# Patient Record
Sex: MALE | Race: WHITE | NOT HISPANIC OR LATINO | Employment: FULL TIME | URBAN - METROPOLITAN AREA
[De-identification: names, ages, dates, MRNs, and addresses within clinical notes are randomized per-mention and may not be internally consistent; named-entity substitution may affect disease eponyms.]

---

## 2021-03-17 ENCOUNTER — ANCILLARY PROCEDURE (OUTPATIENT)
Dept: GENERAL RADIOLOGY | Facility: OTHER | Age: 51
End: 2021-03-17
Attending: PHYSICIAN ASSISTANT
Payer: COMMERCIAL

## 2021-03-17 ENCOUNTER — OFFICE VISIT (OUTPATIENT)
Dept: FAMILY MEDICINE | Facility: OTHER | Age: 51
End: 2021-03-17
Payer: COMMERCIAL

## 2021-03-17 VITALS
HEART RATE: 80 BPM | HEIGHT: 66 IN | DIASTOLIC BLOOD PRESSURE: 70 MMHG | SYSTOLIC BLOOD PRESSURE: 130 MMHG | WEIGHT: 135 LBS | BODY MASS INDEX: 21.69 KG/M2 | TEMPERATURE: 98.8 F

## 2021-03-17 DIAGNOSIS — R20.2 NUMBNESS AND TINGLING IN BOTH HANDS: ICD-10-CM

## 2021-03-17 DIAGNOSIS — R20.0 NUMBNESS AND TINGLING IN BOTH HANDS: ICD-10-CM

## 2021-03-17 DIAGNOSIS — R97.20 ELEVATED PROSTATE SPECIFIC ANTIGEN (PSA): ICD-10-CM

## 2021-03-17 DIAGNOSIS — E78.5 HYPERLIPIDEMIA LDL GOAL <100: ICD-10-CM

## 2021-03-17 DIAGNOSIS — F17.200 TOBACCO USE DISORDER: ICD-10-CM

## 2021-03-17 DIAGNOSIS — R06.02 SHORTNESS OF BREATH: Primary | ICD-10-CM

## 2021-03-17 DIAGNOSIS — Z13.6 CARDIOVASCULAR SCREENING; LDL GOAL LESS THAN 100: ICD-10-CM

## 2021-03-17 DIAGNOSIS — Z12.5 SCREENING FOR PROSTATE CANCER: ICD-10-CM

## 2021-03-17 DIAGNOSIS — R06.02 SHORTNESS OF BREATH: ICD-10-CM

## 2021-03-17 LAB
ALBUMIN SERPL-MCNC: 4.2 G/DL (ref 3.4–5)
ALP SERPL-CCNC: 96 U/L (ref 40–150)
ALT SERPL W P-5'-P-CCNC: 24 U/L (ref 0–70)
ANION GAP SERPL CALCULATED.3IONS-SCNC: 7 MMOL/L (ref 3–14)
AST SERPL W P-5'-P-CCNC: 17 U/L (ref 0–45)
BASOPHILS # BLD AUTO: 0 10E9/L (ref 0–0.2)
BASOPHILS NFR BLD AUTO: 0.4 %
BILIRUB SERPL-MCNC: 0.7 MG/DL (ref 0.2–1.3)
BUN SERPL-MCNC: 10 MG/DL (ref 7–30)
CALCIUM SERPL-MCNC: 9.1 MG/DL (ref 8.5–10.1)
CHLORIDE SERPL-SCNC: 102 MMOL/L (ref 94–109)
CHOLEST SERPL-MCNC: 247 MG/DL
CO2 SERPL-SCNC: 25 MMOL/L (ref 20–32)
CREAT SERPL-MCNC: 0.83 MG/DL (ref 0.66–1.25)
CRP SERPL-MCNC: <2.9 MG/L (ref 0–8)
DIFFERENTIAL METHOD BLD: NORMAL
EOSINOPHIL # BLD AUTO: 0.1 10E9/L (ref 0–0.7)
EOSINOPHIL NFR BLD AUTO: 0.6 %
ERYTHROCYTE [DISTWIDTH] IN BLOOD BY AUTOMATED COUNT: 13.3 % (ref 10–15)
ERYTHROCYTE [SEDIMENTATION RATE] IN BLOOD BY WESTERGREN METHOD: 4 MM/H (ref 0–20)
GFR SERPL CREATININE-BSD FRML MDRD: >90 ML/MIN/{1.73_M2}
GLUCOSE SERPL-MCNC: 81 MG/DL (ref 70–99)
HCT VFR BLD AUTO: 49.3 % (ref 40–53)
HDLC SERPL-MCNC: 64 MG/DL
HGB BLD-MCNC: 16.8 G/DL (ref 13.3–17.7)
LDLC SERPL CALC-MCNC: 170 MG/DL
LYMPHOCYTES # BLD AUTO: 1.7 10E9/L (ref 0.8–5.3)
LYMPHOCYTES NFR BLD AUTO: 19.6 %
MCH RBC QN AUTO: 31.9 PG (ref 26.5–33)
MCHC RBC AUTO-ENTMCNC: 34.1 G/DL (ref 31.5–36.5)
MCV RBC AUTO: 94 FL (ref 78–100)
MONOCYTES # BLD AUTO: 0.8 10E9/L (ref 0–1.3)
MONOCYTES NFR BLD AUTO: 9.3 %
NEUTROPHILS # BLD AUTO: 6 10E9/L (ref 1.6–8.3)
NEUTROPHILS NFR BLD AUTO: 70.1 %
NONHDLC SERPL-MCNC: 183 MG/DL
PLATELET # BLD AUTO: 215 10E9/L (ref 150–450)
POTASSIUM SERPL-SCNC: 4.5 MMOL/L (ref 3.4–5.3)
PROT SERPL-MCNC: 7.7 G/DL (ref 6.8–8.8)
PSA SERPL-ACNC: 4.18 UG/L (ref 0–4)
RBC # BLD AUTO: 5.27 10E12/L (ref 4.4–5.9)
SODIUM SERPL-SCNC: 134 MMOL/L (ref 133–144)
TRIGL SERPL-MCNC: 64 MG/DL
TSH SERPL DL<=0.005 MIU/L-ACNC: 0.93 MU/L (ref 0.4–4)
WBC # BLD AUTO: 8.5 10E9/L (ref 4–11)

## 2021-03-17 PROCEDURE — 80050 GENERAL HEALTH PANEL: CPT | Performed by: PHYSICIAN ASSISTANT

## 2021-03-17 PROCEDURE — 86038 ANTINUCLEAR ANTIBODIES: CPT | Performed by: PHYSICIAN ASSISTANT

## 2021-03-17 PROCEDURE — 80061 LIPID PANEL: CPT | Performed by: PHYSICIAN ASSISTANT

## 2021-03-17 PROCEDURE — 86431 RHEUMATOID FACTOR QUANT: CPT | Performed by: PHYSICIAN ASSISTANT

## 2021-03-17 PROCEDURE — 99214 OFFICE O/P EST MOD 30 MIN: CPT | Performed by: PHYSICIAN ASSISTANT

## 2021-03-17 PROCEDURE — 86140 C-REACTIVE PROTEIN: CPT | Performed by: PHYSICIAN ASSISTANT

## 2021-03-17 PROCEDURE — 36415 COLL VENOUS BLD VENIPUNCTURE: CPT | Performed by: PHYSICIAN ASSISTANT

## 2021-03-17 PROCEDURE — 85652 RBC SED RATE AUTOMATED: CPT | Performed by: PHYSICIAN ASSISTANT

## 2021-03-17 PROCEDURE — 71046 X-RAY EXAM CHEST 2 VIEWS: CPT | Performed by: RADIOLOGY

## 2021-03-17 PROCEDURE — G0103 PSA SCREENING: HCPCS | Performed by: PHYSICIAN ASSISTANT

## 2021-03-17 RX ORDER — ATORVASTATIN CALCIUM 20 MG/1
20 TABLET, FILM COATED ORAL DAILY
Qty: 90 TABLET | Refills: 0 | Status: SHIPPED | OUTPATIENT
Start: 2021-03-17 | End: 2024-09-26

## 2021-03-17 ASSESSMENT — ANXIETY QUESTIONNAIRES
3. WORRYING TOO MUCH ABOUT DIFFERENT THINGS: NOT AT ALL
GAD7 TOTAL SCORE: 0
5. BEING SO RESTLESS THAT IT IS HARD TO SIT STILL: NOT AT ALL
6. BECOMING EASILY ANNOYED OR IRRITABLE: NOT AT ALL
7. FEELING AFRAID AS IF SOMETHING AWFUL MIGHT HAPPEN: NOT AT ALL
1. FEELING NERVOUS, ANXIOUS, OR ON EDGE: NOT AT ALL
GAD7 TOTAL SCORE: 0
4. TROUBLE RELAXING: NOT AT ALL
7. FEELING AFRAID AS IF SOMETHING AWFUL MIGHT HAPPEN: NOT AT ALL
2. NOT BEING ABLE TO STOP OR CONTROL WORRYING: NOT AT ALL
GAD7 TOTAL SCORE: 0

## 2021-03-17 ASSESSMENT — PATIENT HEALTH QUESTIONNAIRE - PHQ9
10. IF YOU CHECKED OFF ANY PROBLEMS, HOW DIFFICULT HAVE THESE PROBLEMS MADE IT FOR YOU TO DO YOUR WORK, TAKE CARE OF THINGS AT HOME, OR GET ALONG WITH OTHER PEOPLE: NOT DIFFICULT AT ALL
SUM OF ALL RESPONSES TO PHQ QUESTIONS 1-9: 1
SUM OF ALL RESPONSES TO PHQ QUESTIONS 1-9: 1

## 2021-03-17 ASSESSMENT — MIFFLIN-ST. JEOR: SCORE: 1414.86

## 2021-03-17 NOTE — LETTER
March 18, 2021      Dewey uSltana  [address]  [facility] [address]        Dear ,    His thyroid is normal.  His kidney, liver function is normal.  Blood count normal.  Inflammatory marker negative.  His cholesterol is too high and highly recommend starting a statin to lower as well as quitting smoking and following Mediterranean Diet.  Sent med to pharmacy.   His prostate lab was just slightly elevated, at this time recommend repeating in 3-6 months.  He will also need to repeat cholesterol in 3-6 months.  Please schedule for stress echo.  His chest x-ray was normal.       Resulted Orders   Comprehensive metabolic panel   Result Value Ref Range    Sodium 134 133 - 144 mmol/L    Potassium 4.5 3.4 - 5.3 mmol/L    Chloride 102 94 - 109 mmol/L    Carbon Dioxide 25 20 - 32 mmol/L    Anion Gap 7 3 - 14 mmol/L    Glucose 81 70 - 99 mg/dL    Urea Nitrogen 10 7 - 30 mg/dL    Creatinine 0.83 0.66 - 1.25 mg/dL    GFR Estimate >90 >60 mL/min/[1.73_m2]      Comment:      Non  GFR Calc  Starting 12/18/2018, serum creatinine based estimated GFR (eGFR) will be   calculated using the Chronic Kidney Disease Epidemiology Collaboration   (CKD-EPI) equation.      GFR Estimate If Black >90 >60 mL/min/[1.73_m2]      Comment:       GFR Calc  Starting 12/18/2018, serum creatinine based estimated GFR (eGFR) will be   calculated using the Chronic Kidney Disease Epidemiology Collaboration   (CKD-EPI) equation.      Calcium 9.1 8.5 - 10.1 mg/dL    Bilirubin Total 0.7 0.2 - 1.3 mg/dL    Albumin 4.2 3.4 - 5.0 g/dL    Protein Total 7.7 6.8 - 8.8 g/dL    Alkaline Phosphatase 96 40 - 150 U/L    ALT 24 0 - 70 U/L    AST 17 0 - 45 U/L   TSH with free T4 reflex   Result Value Ref Range    TSH 0.93 0.40 - 4.00 mU/L   CBC with platelets differential   Result Value Ref Range    WBC 8.5 4.0 - 11.0 10e9/L    RBC Count 5.27 4.4 - 5.9 10e12/L    Hemoglobin 16.8 13.3 - 17.7 g/dL    Hematocrit 49.3  40.0 - 53.0 %    MCV 94 78 - 100 fl    MCH 31.9 26.5 - 33.0 pg    MCHC 34.1 31.5 - 36.5 g/dL    RDW 13.3 10.0 - 15.0 %    Platelet Count 215 150 - 450 10e9/L    % Neutrophils 70.1 %    % Lymphocytes 19.6 %    % Monocytes 9.3 %    % Eosinophils 0.6 %    % Basophils 0.4 %    Absolute Neutrophil 6.0 1.6 - 8.3 10e9/L    Absolute Lymphocytes 1.7 0.8 - 5.3 10e9/L    Absolute Monocytes 0.8 0.0 - 1.3 10e9/L    Absolute Eosinophils 0.1 0.0 - 0.7 10e9/L    Absolute Basophils 0.0 0.0 - 0.2 10e9/L    Diff Method Automated Method    PSA, screen   Result Value Ref Range    PSA 4.18 (H) 0 - 4 ug/L      Comment:      Assay Method:  Chemiluminescence using Siemens Vista analyzer   CRP, inflammation   Result Value Ref Range    CRP Inflammation <2.9 0.0 - 8.0 mg/L   ESR: Erythrocyte sedimentation rate   Result Value Ref Range    Sed Rate 4 0 - 20 mm/h   Lipid panel reflex to direct LDL Fasting   Result Value Ref Range    Cholesterol 247 (H) <200 mg/dL      Comment:      Desirable:       <200 mg/dl    Triglycerides 64 <150 mg/dL    HDL Cholesterol 64 >39 mg/dL    LDL Cholesterol Calculated 170 (H) <100 mg/dL      Comment:      Above desirable:  100-129 mg/dl  Borderline High:  130-159 mg/dL  High:             160-189 mg/dL  Very high:       >189 mg/dl      Non HDL Cholesterol 183 (H) <130 mg/dL      Comment:      Above Desirable:  130-159 mg/dl  Borderline high:  160-189 mg/dl  High:             190-219 mg/dl  Very high:       >219 mg/dl         If you have any questions or concerns, please call the clinic at the number listed above.       Sincerely,      Alba Arechiga PA-C

## 2021-03-17 NOTE — PROGRESS NOTES
Assessment & Plan     Shortness of breath  -Differentials include but are not limited to pulmonary etiology or mass, especially given patient has smoked for the past 20 years; cardiac etiology- arrhythmia, cardiomyopathy, valve disease or prolapse, especially given patient's family history of heart valve disease; Thyroid or autoimmune etiology  - XR Chest 2 Views today. Final read came back negative for cardiopulmonary disease.  - Recommend getting an Echocardiogram Exercise Stress as patient is experiencing SOB with exertion to rule out other cardiac abnormalities or concerns.  He has had previous EKG and Holter monitor work up without significant abnormalities.    -Will get labs today to further assess and rule out other causes for his SOB.   - Comprehensive metabolic panel   - TSH with free T4 reflex   - CBC with platelets differential   - CRP, inflammation   - ESR: Erythrocyte sedimentation rate    Tobacco use disorder  -Discussed cutting back on smoking and options to help with this. He is interested in decreasing the amount that he smokes per day. He will try to do so and will reach out if he needs something to help with this.  -Given patient's smoking history and SOB, may consider a chest CT to further evaluate pulmonary    Numbness and tingling in both hands/fingers  Discussed with patient that this may be caused by Raynaud's and will get labs today to assess for any underlying autoimmune conditions that may be causing the coolness and tingling that he is experiencing in his fingers. No significant concerns on examination today with overt vascular supply.  Encouraged to quit smoking to help prevent vascular disease.     - Anti Nuclear Ela IgG by IFA with Reflex   - Rheumatoid factor    Screening for prostate cancer  Will get a routine screen for prostrate cancer today- PSA, screen    CARDIOVASCULAR SCREENING; LDL GOAL LESS THAN 100  Will assess cholesterol today, especially given concerns with cardiac  and SOB  - Lipid panel reflex to direct LDL Fasting    Elevated PSA:  - PSA was elevated slightly recommend repeat in 6 months and if continuing to increase referral to Urology.     Hyperlipidemia:  The 10-year ASCVD risk score (Mosby CARMEN Jr., et al., 2013) is: 8.7%    Values used to calculate the score:      Age: 50 years      Sex: Male      Is Non- : No      Diabetic: No      Tobacco smoker: Yes      Systolic Blood Pressure: 130 mmHg      Is BP treated: No      HDL Cholesterol: 64 mg/dL      Total Cholesterol: 247 mg/dL  His cholesterol was elevated, recommend starting statin and re-checking labs in 3-6 months.     Tobacco Cessation:   reports that he has been smoking cigarettes. He has a 20.00 pack-year smoking history. He has never used smokeless tobacco.  Tobacco Cessation Action Plan: Information offered: Patient not interested at this time    No follow-ups on file.    Options for treatment and follow-up care were reviewed with the patient and/or guardian. Patient and/or guardian engaged in the decision making process and verbalized understanding of the options discussed and agreed with the final plan.    I, Alba Arechiga PA-C, was present with the NP student who participated in the service and in the documentation of the note.  I have verified the history and personally performed the physical exam and medical decision making.  I agree with the assessment and plan of care as documented in the note.       LAMONT Clayton, DNP-FNP Student  [facility]    Alba Arechiga PA-C  [facility] MOHIT Palomo is a 50 year old who presents for the following health issues     History of Present Illness       Vascular Disease:  He presents for follow up of vascular disease.  He never takes nitroglycerin. He is not taking daily aspirin.    He eats 0-1 servings of fruits and vegetables daily.He consumes 1 sweetened beverage(s) daily.He exercises  "with enough effort to increase his heart rate 9 or less minutes per day.  He exercises with enough effort to increase his heart rate 3 or less days per week.   He is taking medications regularly.       Pt presents with concerns about his circulation. Not feeling like he can't breath but that he's not getting enough oxygen. His fingers get cold fast and he cramps in the arches of his feet. He also states he is very sensitive to smells. He was told he had a heart murmur 30 years ago and because his father had a heart valve issue around the age of 60 he wants to stay on top of things.     -Feels he is not getting enough oxygen, maybe a slight tingling in his body or pre-dizzy type feeling, but does not feel short of breath  -Has been occurring off and on for years.  -Sensitive to smells such as scented candles or wax warmer, could be a trigger  -Drinks \"a lot\" of caffeine and could be similar to that jitteriness feeling (admits to drinking approximately ten 20 oz bottles of diet mountain dew per day)  -Does not drink much water  -Happens maybe 3-4 times per day or he could go days without experiencing this feeling  -Finger tips feel tingly, occurs about daily during the winter months. They do not turn colors that he is aware of.  -Denies heart palpitations or racing heart. Occasionally has a mid-sternal tightness feeling.  -Almost daily wakes up with soreness feeling in arms, either one or both, shakes them out then they are fine. They do not feel numb or tingly  -Denies headaches, fever, chills, night sweats, cough, numbness or tingling, chest pain  -Has occasional cough, \"smokers cough\"  -Increased shortness of breath with exercise or movement, feels winded. Feel may be related to his smoking.  -Has tried breathing techniques when these sensations come on  -Denies abdominal pain, problems with stools or constipation, bladder, nausea or vomiting, peripheral edema      Review of Systems   Constitutional, HEENT, " "cardiovascular, pulmonary, GI, , musculoskeletal, neuro, skin, endocrine and psych systems are negative, except as otherwise noted.      Objective    /70   Pulse 80   Temp 98.8  F (37.1  C) (Temporal)   Ht 1.684 m (5' 6.3\")   Wt 61.2 kg (135 lb)   PF 99 L/min   BMI 21.59 kg/m    Body mass index is 21.59 kg/m .     Physical Exam   GENERAL: healthy, alert and no distress  NECK: no adenopathy, no asymmetry, masses, or scars and thyroid normal to palpation  RESP: lungs clear to auscultation - no rales, rhonchi or wheezes  CV: regular rate and rhythm, normal S1 S2, no S3 or S4, no murmur, click or rub, no peripheral edema and peripheral pulses strong  ABDOMEN: soft, nontender, no hepatosplenomegaly, no masses and bowel sounds normal  MS: no gross musculoskeletal defects noted, no edema  SKIN: bilateral hands there is no significant color difference, maybe slightly more purple in color over the digits compared to the hands but capillary refill is normal in both extremities.    PSYCH: mentation appears normal, affect normal/bright  LYMPH: no cervical, supraclavicular, axillary, or inguinal adenopathy      CHEST TWO VIEWS  3/17/2021 3:09 PM      HISTORY: Tobacco use disorder. Shortness of breath.     COMPARISON: 1/31/2006.                                                        IMPRESSION: No acute cardiopulmonary disease.     BETHEL FOX MD      Results for orders placed or performed in visit on 03/17/21   XR Chest 2 Views     Status: None    Narrative    CHEST TWO VIEWS  3/17/2021 3:09 PM     HISTORY: Tobacco use disorder. Shortness of breath.    COMPARISON: 1/31/2006.      Impression    IMPRESSION: No acute cardiopulmonary disease.    BETHEL FOX MD   Results for orders placed or performed in visit on 03/17/21   Comprehensive metabolic panel     Status: None   Result Value Ref Range    Sodium 134 133 - 144 mmol/L    Potassium 4.5 3.4 - 5.3 mmol/L    Chloride 102 94 - 109 mmol/L    Carbon Dioxide 25 20 - 32 " mmol/L    Anion Gap 7 3 - 14 mmol/L    Glucose 81 70 - 99 mg/dL    Urea Nitrogen 10 7 - 30 mg/dL    Creatinine 0.83 0.66 - 1.25 mg/dL    GFR Estimate >90 >60 mL/min/[1.73_m2]    GFR Estimate If Black >90 >60 mL/min/[1.73_m2]    Calcium 9.1 8.5 - 10.1 mg/dL    Bilirubin Total 0.7 0.2 - 1.3 mg/dL    Albumin 4.2 3.4 - 5.0 g/dL    Protein Total 7.7 6.8 - 8.8 g/dL    Alkaline Phosphatase 96 40 - 150 U/L    ALT 24 0 - 70 U/L    AST 17 0 - 45 U/L   TSH with free T4 reflex     Status: None   Result Value Ref Range    TSH 0.93 0.40 - 4.00 mU/L   CBC with platelets differential     Status: None   Result Value Ref Range    WBC 8.5 4.0 - 11.0 10e9/L    RBC Count 5.27 4.4 - 5.9 10e12/L    Hemoglobin 16.8 13.3 - 17.7 g/dL    Hematocrit 49.3 40.0 - 53.0 %    MCV 94 78 - 100 fl    MCH 31.9 26.5 - 33.0 pg    MCHC 34.1 31.5 - 36.5 g/dL    RDW 13.3 10.0 - 15.0 %    Platelet Count 215 150 - 450 10e9/L    % Neutrophils 70.1 %    % Lymphocytes 19.6 %    % Monocytes 9.3 %    % Eosinophils 0.6 %    % Basophils 0.4 %    Absolute Neutrophil 6.0 1.6 - 8.3 10e9/L    Absolute Lymphocytes 1.7 0.8 - 5.3 10e9/L    Absolute Monocytes 0.8 0.0 - 1.3 10e9/L    Absolute Eosinophils 0.1 0.0 - 0.7 10e9/L    Absolute Basophils 0.0 0.0 - 0.2 10e9/L    Diff Method Automated Method    PSA, screen     Status: Abnormal   Result Value Ref Range    PSA 4.18 (H) 0 - 4 ug/L   CRP, inflammation     Status: None   Result Value Ref Range    CRP Inflammation <2.9 0.0 - 8.0 mg/L   ESR: Erythrocyte sedimentation rate     Status: None   Result Value Ref Range    Sed Rate 4 0 - 20 mm/h   Lipid panel reflex to direct LDL Fasting     Status: Abnormal   Result Value Ref Range    Cholesterol 247 (H) <200 mg/dL    Triglycerides 64 <150 mg/dL    HDL Cholesterol 64 >39 mg/dL    LDL Cholesterol Calculated 170 (H) <100 mg/dL    Non HDL Cholesterol 183 (H) <130 mg/dL     Answers for HPI/ROS submitted by the patient on 3/17/2021   Chronic problems general questions HPI Form  If you  checked off any problems, how difficult have these problems made it for you to do your work, take care of things at home, or get along with other people?: Not difficult at all  PHQ9 TOTAL SCORE: 1  AMELIA 7 TOTAL SCORE: 0

## 2021-03-18 LAB
ANA SER QL IF: NEGATIVE
RHEUMATOID FACT SER NEPH-ACNC: <7 IU/ML (ref 0–20)

## 2021-03-18 ASSESSMENT — ANXIETY QUESTIONNAIRES: GAD7 TOTAL SCORE: 0

## 2021-03-18 ASSESSMENT — PATIENT HEALTH QUESTIONNAIRE - PHQ9: SUM OF ALL RESPONSES TO PHQ QUESTIONS 1-9: 1

## 2021-03-19 ENCOUNTER — TELEPHONE (OUTPATIENT)
Dept: FAMILY MEDICINE | Facility: OTHER | Age: 51
End: 2021-03-19

## 2021-03-19 NOTE — TELEPHONE ENCOUNTER
----- Message from Alba Arechiga PA-C sent at 3/19/2021  5:57 AM CDT -----  Please call.  Last two labs for autoimmune conditions is negative.     Alba Arechiga PA-C

## 2021-07-09 ENCOUNTER — VIRTUAL VISIT (OUTPATIENT)
Dept: FAMILY MEDICINE | Facility: OTHER | Age: 51
End: 2021-07-09
Payer: COMMERCIAL

## 2021-07-09 DIAGNOSIS — L30.9 HAND ECZEMA: Primary | ICD-10-CM

## 2021-07-09 PROCEDURE — 99213 OFFICE O/P EST LOW 20 MIN: CPT | Mod: 95 | Performed by: PHYSICIAN ASSISTANT

## 2021-07-09 RX ORDER — CLOBETASOL PROPIONATE 0.5 MG/G
CREAM TOPICAL 2 TIMES DAILY
Qty: 30 G | Refills: 0 | Status: SHIPPED | OUTPATIENT
Start: 2021-07-09 | End: 2024-09-26

## 2021-07-09 NOTE — PROGRESS NOTES
Dewey is a 50 year old who is being evaluated via a billable video visit.      How would you like to obtain your AVS? Mail a copy  If the video visit is dropped, the invitation should be resent by: Text to cell phone: [phone]  Will anyone else be joining your video visit? No    Video Start Time: 3:28 PM    Assessment & Plan       ICD-10-CM    1. Hand eczema  L30.9 clobetasol (TEMOVATE) 0.05 % external cream       Symptoms and exam findings consistent with an eczematous rash. Rash not consistent with any plant dermatitis.  Will treat with clobetasol cream to use as directed for up to 2 weeks at a time.  Recommend avoiding washing hands with hot water as this can remove excess moisture from the skin.    Recommend he schedule the stress echo recommended by Alba Arechiga a few months ago and start the Lipitor that he picked up but never started.    ANDREA Ballesteros [facility]   Dewey is a 50 year old who presents for the following health issues:    HPI     Patient has been experiencing some dry, itchy, scaly skin over the dorsum of his right hand and fingers for the past 2 weeks. He had once patch that started this past winter but now it is covering more of the hand. He has tried over the counter hydrocortisone cream, lotions, antibiotic cream and Essential Oil cream with only short term relief of the itching. He denies any new soaps, lotions or detergents but does state he could have come into contact with some sore of plant while mowing recently.      Review of Systems   Constitutional, cardiovascular, pulmonary, and derm systems are negative, except as otherwise noted.      Objective       Vitals:  No vitals were obtained today due to virtual visit.    Physical Exam   GENERAL: Healthy, alert and no distress  EYES: Eyes grossly normal to inspection.  No discharge or erythema, or obvious scleral/conjunctival abnormalities.  RESP: No audible wheeze, cough, or visible  cyanosis.  No visible retractions or increased work of breathing.    SKIN: +Right hand with scaly, dried, skin plaques over dorsal 2nd and 3rd fingers and below 2nd and 3rd MCP joints.   NEURO: Cranial nerves grossly intact.  Mentation and speech appropriate for age.  PSYCH: Mentation appears normal, affect normal/bright, judgement and insight intact, normal speech and appearance well-groomed.        Video-Visit Details    Type of service:  Video Visit    Video End Time: 3:39 pm    Originating Location (pt. Location): Home    Distant Location (provider location):  [facility]     Platform used for Video Visit: [email or web address]

## 2021-09-07 ENCOUNTER — TELEPHONE (OUTPATIENT)
Dept: FAMILY MEDICINE | Facility: OTHER | Age: 51
End: 2021-09-07

## 2021-09-07 NOTE — TELEPHONE ENCOUNTER
Please call patient he is due for lab only visit. Rechecking cholesterol and prostate lab      Alba Arechiga PA-C     no

## 2022-09-09 ENCOUNTER — OFFICE VISIT (OUTPATIENT)
Dept: FAMILY MEDICINE | Facility: OTHER | Age: 52
End: 2022-09-09
Payer: COMMERCIAL

## 2022-09-09 VITALS
HEIGHT: 67 IN | TEMPERATURE: 98 F | SYSTOLIC BLOOD PRESSURE: 116 MMHG | HEART RATE: 70 BPM | OXYGEN SATURATION: 97 % | DIASTOLIC BLOOD PRESSURE: 78 MMHG | BODY MASS INDEX: 20.33 KG/M2 | WEIGHT: 129.5 LBS

## 2022-09-09 DIAGNOSIS — F17.200 TOBACCO USE DISORDER: ICD-10-CM

## 2022-09-09 DIAGNOSIS — R06.02 SHORTNESS OF BREATH: Primary | ICD-10-CM

## 2022-09-09 DIAGNOSIS — I49.9 CARDIAC ARRHYTHMIA, UNSPECIFIED CARDIAC ARRHYTHMIA TYPE: ICD-10-CM

## 2022-09-09 DIAGNOSIS — K21.9 GASTROESOPHAGEAL REFLUX DISEASE, UNSPECIFIED WHETHER ESOPHAGITIS PRESENT: ICD-10-CM

## 2022-09-09 PROCEDURE — 99214 OFFICE O/P EST MOD 30 MIN: CPT | Performed by: PHYSICIAN ASSISTANT

## 2022-09-09 RX ORDER — ALBUTEROL SULFATE 90 UG/1
1-2 AEROSOL, METERED RESPIRATORY (INHALATION) EVERY 6 HOURS PRN
Qty: 6.7 G | Refills: 0 | Status: SHIPPED | OUTPATIENT
Start: 2022-09-09 | End: 2022-12-06

## 2022-09-09 ASSESSMENT — PATIENT HEALTH QUESTIONNAIRE - PHQ9
SUM OF ALL RESPONSES TO PHQ QUESTIONS 1-9: 1
SUM OF ALL RESPONSES TO PHQ QUESTIONS 1-9: 1
10. IF YOU CHECKED OFF ANY PROBLEMS, HOW DIFFICULT HAVE THESE PROBLEMS MADE IT FOR YOU TO DO YOUR WORK, TAKE CARE OF THINGS AT HOME, OR GET ALONG WITH OTHER PEOPLE: NOT DIFFICULT AT ALL

## 2022-09-09 ASSESSMENT — PAIN SCALES - GENERAL: PAINLEVEL: NO PAIN (0)

## 2022-09-13 ENCOUNTER — HOSPITAL ENCOUNTER (OUTPATIENT)
Dept: CARDIOLOGY | Facility: CLINIC | Age: 52
Discharge: HOME | End: 2022-09-13
Attending: PHYSICIAN ASSISTANT | Admitting: PHYSICIAN ASSISTANT
Payer: COMMERCIAL

## 2022-09-13 DIAGNOSIS — I49.9 CARDIAC ARRHYTHMIA, UNSPECIFIED CARDIAC ARRHYTHMIA TYPE: ICD-10-CM

## 2022-09-13 DIAGNOSIS — R06.02 SHORTNESS OF BREATH: ICD-10-CM

## 2022-09-13 PROCEDURE — 93242 EXT ECG>48HR<7D RECORDING: CPT

## 2022-10-04 DIAGNOSIS — F17.200 TOBACCO USE DISORDER: Primary | ICD-10-CM

## 2022-10-04 RX ORDER — TIOTROPIUM BROMIDE 18 UG/1
18 CAPSULE ORAL; RESPIRATORY (INHALATION) DAILY
Qty: 30 CAPSULE | Refills: 5 | Status: SHIPPED | OUTPATIENT
Start: 2022-10-04 | End: 2024-09-26

## 2022-12-06 ENCOUNTER — APPOINTMENT (OUTPATIENT)
Dept: GENERAL RADIOLOGY | Facility: CLINIC | Age: 52
End: 2022-12-06
Attending: EMERGENCY MEDICINE
Payer: COMMERCIAL

## 2022-12-06 ENCOUNTER — APPOINTMENT (OUTPATIENT)
Dept: CT IMAGING | Facility: CLINIC | Age: 52
End: 2022-12-06
Attending: EMERGENCY MEDICINE
Payer: COMMERCIAL

## 2022-12-06 ENCOUNTER — HOSPITAL ENCOUNTER (EMERGENCY)
Facility: CLINIC | Age: 52
Discharge: HOME | End: 2022-12-06
Attending: EMERGENCY MEDICINE | Admitting: EMERGENCY MEDICINE
Payer: COMMERCIAL

## 2022-12-06 ENCOUNTER — TELEPHONE (OUTPATIENT)
Dept: FAMILY MEDICINE | Facility: CLINIC | Age: 52
End: 2022-12-06

## 2022-12-06 ENCOUNTER — VIRTUAL VISIT (OUTPATIENT)
Dept: FAMILY MEDICINE | Facility: CLINIC | Age: 52
End: 2022-12-06
Payer: COMMERCIAL

## 2022-12-06 ENCOUNTER — APPOINTMENT (OUTPATIENT)
Dept: MRI IMAGING | Facility: CLINIC | Age: 52
End: 2022-12-06
Attending: EMERGENCY MEDICINE
Payer: COMMERCIAL

## 2022-12-06 VITALS
HEIGHT: 65 IN | HEART RATE: 62 BPM | OXYGEN SATURATION: 100 % | SYSTOLIC BLOOD PRESSURE: 158 MMHG | DIASTOLIC BLOOD PRESSURE: 79 MMHG | BODY MASS INDEX: 21.66 KG/M2 | RESPIRATION RATE: 18 BRPM | TEMPERATURE: 98.1 F | WEIGHT: 130 LBS

## 2022-12-06 DIAGNOSIS — S01.552A OPEN WOUND OF TONGUE DUE TO BITE: ICD-10-CM

## 2022-12-06 DIAGNOSIS — R56.9 WITNESSED SEIZURE-LIKE ACTIVITY (H): ICD-10-CM

## 2022-12-06 DIAGNOSIS — G93.89 BRAIN MASS: ICD-10-CM

## 2022-12-06 DIAGNOSIS — Z78.9 EXCESSIVE CAFFEINE INTAKE: ICD-10-CM

## 2022-12-06 DIAGNOSIS — F44.5: Primary | ICD-10-CM

## 2022-12-06 LAB
ALBUMIN SERPL-MCNC: 3.7 G/DL (ref 3.4–5)
ALP SERPL-CCNC: 84 U/L (ref 40–150)
ALT SERPL W P-5'-P-CCNC: 25 U/L (ref 0–70)
ANION GAP SERPL CALCULATED.3IONS-SCNC: 6 MMOL/L (ref 3–14)
AST SERPL W P-5'-P-CCNC: 24 U/L (ref 0–45)
BASOPHILS # BLD AUTO: 0.1 10E3/UL (ref 0–0.2)
BASOPHILS NFR BLD AUTO: 1 %
BILIRUB SERPL-MCNC: 0.5 MG/DL (ref 0.2–1.3)
BUN SERPL-MCNC: 7 MG/DL (ref 7–30)
CALCIUM SERPL-MCNC: 8.8 MG/DL (ref 8.5–10.1)
CHLORIDE BLD-SCNC: 104 MMOL/L (ref 94–109)
CO2 SERPL-SCNC: 26 MMOL/L (ref 20–32)
CREAT SERPL-MCNC: 0.68 MG/DL (ref 0.66–1.25)
EOSINOPHIL # BLD AUTO: 0.1 10E3/UL (ref 0–0.7)
EOSINOPHIL NFR BLD AUTO: 1 %
ERYTHROCYTE [DISTWIDTH] IN BLOOD BY AUTOMATED COUNT: 13 % (ref 10–15)
GFR SERPL CREATININE-BSD FRML MDRD: >90 ML/MIN/1.73M2
GLUCOSE BLD-MCNC: 93 MG/DL (ref 70–99)
HCT VFR BLD AUTO: 47.6 % (ref 40–53)
HGB BLD-MCNC: 16.2 G/DL (ref 13.3–17.7)
IMM GRANULOCYTES # BLD: 0.1 10E3/UL
IMM GRANULOCYTES NFR BLD: 1 %
LYMPHOCYTES # BLD AUTO: 2 10E3/UL (ref 0.8–5.3)
LYMPHOCYTES NFR BLD AUTO: 25 %
MCH RBC QN AUTO: 31.6 PG (ref 26.5–33)
MCHC RBC AUTO-ENTMCNC: 34 G/DL (ref 31.5–36.5)
MCV RBC AUTO: 93 FL (ref 78–100)
MONOCYTES # BLD AUTO: 0.9 10E3/UL (ref 0–1.3)
MONOCYTES NFR BLD AUTO: 12 %
NEUTROPHILS # BLD AUTO: 4.7 10E3/UL (ref 1.6–8.3)
NEUTROPHILS NFR BLD AUTO: 60 %
NRBC # BLD AUTO: 0 10E3/UL
NRBC BLD AUTO-RTO: 0 /100
PLATELET # BLD AUTO: 247 10E3/UL (ref 150–450)
POTASSIUM BLD-SCNC: 4 MMOL/L (ref 3.4–5.3)
PROT SERPL-MCNC: 7.1 G/DL (ref 6.8–8.8)
RBC # BLD AUTO: 5.13 10E6/UL (ref 4.4–5.9)
SODIUM SERPL-SCNC: 136 MMOL/L (ref 133–144)
WBC # BLD AUTO: 7.8 10E3/UL (ref 4–11)

## 2022-12-06 PROCEDURE — 70553 MRI BRAIN STEM W/O & W/DYE: CPT

## 2022-12-06 PROCEDURE — 96374 THER/PROPH/DIAG INJ IV PUSH: CPT | Mod: 59 | Performed by: EMERGENCY MEDICINE

## 2022-12-06 PROCEDURE — 36415 COLL VENOUS BLD VENIPUNCTURE: CPT | Performed by: EMERGENCY MEDICINE

## 2022-12-06 PROCEDURE — 99285 EMERGENCY DEPT VISIT HI MDM: CPT | Performed by: EMERGENCY MEDICINE

## 2022-12-06 PROCEDURE — A9585 GADOBUTROL INJECTION: HCPCS | Performed by: EMERGENCY MEDICINE

## 2022-12-06 PROCEDURE — 99213 OFFICE O/P EST LOW 20 MIN: CPT | Mod: 95 | Performed by: INTERNAL MEDICINE

## 2022-12-06 PROCEDURE — 70450 CT HEAD/BRAIN W/O DYE: CPT

## 2022-12-06 PROCEDURE — 70030 X-RAY EYE FOR FOREIGN BODY: CPT

## 2022-12-06 PROCEDURE — 99285 EMERGENCY DEPT VISIT HI MDM: CPT | Mod: 25 | Performed by: EMERGENCY MEDICINE

## 2022-12-06 PROCEDURE — 85025 COMPLETE CBC W/AUTO DIFF WBC: CPT | Performed by: EMERGENCY MEDICINE

## 2022-12-06 PROCEDURE — 80053 COMPREHEN METABOLIC PANEL: CPT | Performed by: EMERGENCY MEDICINE

## 2022-12-06 PROCEDURE — 255N000002 HC RX 255 OP 636: Performed by: EMERGENCY MEDICINE

## 2022-12-06 RX ORDER — GADOBUTROL 604.72 MG/ML
7.5 INJECTION INTRAVENOUS ONCE
Status: COMPLETED | OUTPATIENT
Start: 2022-12-06 | End: 2022-12-06

## 2022-12-06 RX ADMIN — GADOBUTROL 6 ML: 604.72 INJECTION INTRAVENOUS at 20:59

## 2022-12-06 ASSESSMENT — ACTIVITIES OF DAILY LIVING (ADL)
ADLS_ACUITY_SCORE: 35

## 2022-12-06 NOTE — TELEPHONE ENCOUNTER
Attempted to contact patient prior to virtual appointment  today.    No answer. LVM @ 3:20    Lisa Arambula RN  12/06/22

## 2022-12-06 NOTE — ED TRIAGE NOTES
Pt presents post possible seizure.  Pt states that he may have had a seizure on Thursday.  Pt states that he smoked a lot of cigarettes and lots of caffeine that day, stress.  Pt states that he has bad hemrroids and states that he thrashes around in pain at times. Daughter witnessed event.  Daughter is describing a possible postictal period after.  Pt states that he was told to come to the ED if he could not be seen in the next few days.       Triage Assessment     Row Name 12/06/22 1515       Triage Assessment (Adult)    Airway WDL WDL       Respiratory WDL    Respiratory WDL WDL       Skin Circulation/Temperature WDL    Skin Circulation/Temperature WDL WDL       Cardiac WDL    Cardiac WDL WDL       Peripheral/Neurovascular WDL    Peripheral Neurovascular WDL WDL       Cognitive/Neuro/Behavioral WDL    Cognitive/Neuro/Behavioral WDL WDL

## 2022-12-06 NOTE — PROGRESS NOTES
Dewey is a 52 year old who is being evaluated via a billable video visit.      How would you like to obtain your AVS? Mail a copy  If the video visit is dropped, the invitation should be resent by: Send to e-mail at: [email or web address]  Will anyone else be joining your video visit? No        Assessment & Plan     1.  Conversion disorder with seizure acute episode based on history on 12/1/2022.  Informed patient that he needs to be seen in the clinic and examined and then determine additional evaluation including complete blood test, diagnostic study of the brain i.e. CT scan or MRI scan and perhaps neurology consultation.  His history is quite convincing of seizure episode.  I will have my staff try to facilitate appointment at [facility].  2.  Open wound of the tongue due to a bite.  Augmentin prescribed yesterday in urgent care.  Tongue bite related to #1.  3.  Chronic tobacco use.  4.  Excessive caffeine intake.    Patrice Albarran MD  [facility]   Dewey is a 52 year old, presenting for the following health issues:  Tongue Lesion(S)    Patient went to urgent care last night for tongue but kept this appointment for smoking cessation       History of Present Illness       Reason for visit:  Bite toungh infection  Symptom onset:  3-7 days ago  Symptoms include:  Sore  Symptom intensity:  Moderate  Symptom progression:  Improving  Had these symptoms before:  Vicki consumes 1 sweetened beverage(s) daily. He exercises with enough effort to increase his heart rate 3 or less days per week.   He is taking medications regularly.     52-year-old gentleman set up a phone visit today for a tongue bite he received in the middle of the night on December 1, 2022.  He felt his tongue is infected.  However he could not wait and so went to urgent care yesterday evening and was prescribed Augmentin which he started today.    At about 3 in the morning on 12/1/2022 patient states he  bit his tongue in his sleep and his daughter witnessed what appears to be a seizure episode.  I also talked to the 15-year-old daughter on the phone.      Daughter indicates that she was not quite sleep at 3 in the morning when she heard some rumbling in her father's room.  The dog started barking.  When she went in she felt that her father was unresponsive, struggling to breathe with his mouth open and having jerking movements.  Arms were jerky events and was moving back and forth.  He was not responsive if she feels for maybe 5 to 7 minutes.  Then he started coming around with garbled speech that lasted perhaps 5 minutes.  He got up and walked.  He was unbalanced on his feet though he did not fall.  Eventually cleared up.    According to patient he had a stressful day and has been smoking more than usual and also drinking Mountain Dew.  He thinks he had more than 10 cans of Mountain Dew that day.  He denies drug use or alcohol use.  Lives in [facility].      Review of Systems   Constitutional, HEENT, cardiovascular, pulmonary, GI, , musculoskeletal, neuro, skin, endocrine and psych systems are negative, except as otherwise noted.      Objective           Vitals:  No vitals were obtained today due to virtual visit.    Physical Exam   GENERAL: Healthy, alert and no distress  EYES: Eyes grossly normal to inspection.  No discharge or erythema, or obvious scleral/conjunctival abnormalities.  RESP: No audible wheeze, cough, or visible cyanosis.  No visible retractions or increased work of breathing.    SKIN: Visible skin clear. No significant rash, abnormal pigmentation or lesions.  NEURO: Cranial nerves grossly intact.  Mentation and speech appropriate for age.  PSYCH: Mentation appears normal, affect normal/bright, judgement and insight intact, normal speech and appearance well-groomed.      Time 15 min

## 2022-12-07 ENCOUNTER — TELEPHONE (OUTPATIENT)
Dept: NEUROLOGY | Facility: CLINIC | Age: 52
End: 2022-12-07

## 2022-12-07 ENCOUNTER — TELEPHONE (OUTPATIENT)
Dept: NEUROSURGERY | Facility: CLINIC | Age: 52
End: 2022-12-07

## 2022-12-07 DIAGNOSIS — R56.9 SEIZURE (H): Primary | ICD-10-CM

## 2022-12-07 NOTE — DISCHARGE INSTRUCTIONS
Referral sent for follow-up both with neurology and with neurosurgery.    If you have any concerns return at any time.    It was very nice to meet you.  I hope that all of your follow-up goes smoothly.  Have a wonderful Anjum season!!

## 2022-12-07 NOTE — TELEPHONE ENCOUNTER
Writer ABIMBOLA for pt regarding neurosurgery referral.    Please schedule a return, in person or video visit with Dr. Choudhury for next available    Bhavani Portillo

## 2022-12-07 NOTE — ED PROVIDER NOTES
"  History     Chief Complaint   Patient presents with     Seizures     HPI  History per patient, daughter, medical records    This is a 52-year-old male, history of significant tobacco use, excessive caffeine intake, GERD, presenting after possible seizure.  Patient states that last Thursday night 5 days ago, he went to bed feeling stressed.  He was having a lot of hemorrhoid pain.  He has had this in the past.  It makes him restless and he had difficulty falling asleep.  He also acknowledges that he drank a lot more caffeine than usual that day, greater than 10 Mountain Dew and also smoked more than 1 pack of cigarettes.  At about 3 AM his daughter was just getting ready for bed when the dog started barking.  She heard unusual noises in her dad's room and went to check on him.  He was balled up in his blankets, jerking around and making gasping noises with his mouth.  His eyes were open and he seemed to be looking around but he did not respond to her.  She noted a little blood on the corner of his mouth.  She believes that this lasted for about 7 minutes while she was trying to speak with him and wake him up.  He seemed to get a little better but had what she describes as garbled speech.  He apparently stood and walked and unbalanced matter \"like he was drunk\" to use the bathroom and then walked around the house for about 5 minutes.  He was unable to settle back into bed and no further incident noted.  This whole thing lasted about 25 minutes.  The next day he had a mild headache.  Patient denies any recent head injury.  No recent illnesses including fevers, chills, sinus pain or pressure or drainage, vision changes, difficulty swallowing, speech difficulties, chest pain, shortness of breath, cold or cough symptoms, nausea, vomiting.  He has intermittent rectal pain which she associates with hemorrhoids.  No weakness or dizziness.  No history of seizures.  Of note, patient did not bite his tongue during this " incident.  No loss of urine.  He was seen in urgent care yesterday for concerns of a tongue infection and started on Augmentin.  He is only taken 1 dose.    Allergies:  Allergies   Allergen Reactions     No Known Drug Allergies        Problem List:    Patient Active Problem List    Diagnosis Date Noted     Excessive caffeine intake 12/06/2022     Priority: Medium     CARDIOVASCULAR SCREENING; LDL GOAL LESS THAN 130 04/08/2013     Priority: Medium     Depressive disorder, not elsewhere classified 12/13/2007     Priority: Medium     Tobacco use disorder 06/01/2007     Priority: Medium     Esophageal reflux 06/01/2007     Priority: Medium        Past Medical History:    Past Medical History:   Diagnosis Date     Depressive disorder, not elsewhere classified      Excessive caffeine intake 12/6/2022     Unspecified hemorrhoids without mention of complication        Past Surgical History:    Past Surgical History:   Procedure Laterality Date     COLONOSCOPY  07/07/2006     HC ANOSCOPY W BIOPSY SINGLE/MULTIPLE  06/09/06    Large internal hemorrhoids with a component of rectal prolapse     HC INCISION TENDON SHEATH FINGER      Trigger finger release, right.       Family History:    Family History   Problem Relation Age of Onset     Diabetes Brother      Alcohol/Drug Brother        Social History:  Marital Status:  Single [1]  Social History     Tobacco Use     Smoking status: Every Day     Packs/day: 1.00     Years: 20.00     Pack years: 20.00     Types: Cigarettes     Passive exposure: Current     Smokeless tobacco: Never   Vaping Use     Vaping Use: Never used   Substance Use Topics     Alcohol use: Yes     Drug use: No        Medications:    amoxicillin-clavulanate (AUGMENTIN) 875-125 MG tablet  atorvastatin (LIPITOR) 20 MG tablet  tiotropium (SPIRIVA) 18 MCG inhaled capsule  clobetasol (TEMOVATE) 0.05 % external cream          Review of Systems   All other ROS reviewed and are negative or non-contributory except as  "stated in HPI.     Physical Exam   BP: (!) 158/79  Pulse: 62  Temp: 98.1  F (36.7  C)  Resp: 18  Height: 165.1 cm (5' 5\")  Weight: 59 kg (130 lb)  SpO2: 100 %      Physical Exam  Vitals and nursing note reviewed.   Constitutional:       Appearance: Normal appearance. He is normal weight.      Comments: Thin, healthy-appearing male sitting in the bed   HENT:      Head: Normocephalic.      Right Ear: Tympanic membrane and ear canal normal.      Left Ear: Tympanic membrane and ear canal normal.      Nose: Nose normal.      Mouth/Throat:      Mouth: Mucous membranes are moist.      Comments: Healing area on the left side of the tongue where patient bit it.  Eyes:      Extraocular Movements: Extraocular movements intact.      Conjunctiva/sclera: Conjunctivae normal.      Pupils: Pupils are equal, round, and reactive to light.   Cardiovascular:      Rate and Rhythm: Normal rate and regular rhythm.      Pulses: Normal pulses.      Heart sounds: Normal heart sounds.   Pulmonary:      Effort: Pulmonary effort is normal.      Breath sounds: Normal breath sounds.   Abdominal:      General: There is no distension.   Musculoskeletal:         General: Normal range of motion.      Cervical back: Normal range of motion and neck supple.   Skin:     General: Skin is warm and dry.      Coloration: Skin is not pale.   Neurological:      General: No focal deficit present.      Mental Status: He is alert and oriented to person, place, and time.      Cranial Nerves: No cranial nerve deficit.      Sensory: No sensory deficit.      Motor: No weakness.      Coordination: Coordination normal.      Gait: Gait normal.   Psychiatric:         Mood and Affect: Mood normal.         Behavior: Behavior normal.         ED Course (with Medical Decision Making)    Pt seen and examined by me.  RN and EPIC notes reviewed.       Patient with episode in the middle of the night about 5 days ago that sounds like possibly a seizure versus other sleep related " "event.  He did bite his tongue which makes me more concerned for possible seizure.  He has no obvious neurologic deficits at this point.    Going to check basic labs.  Head CT.  Labs normal.  Head CT showed a moderately dilated temporal horn with concerns for possible abnormal temporal lobe pathology versus intraventricular mass.  MRI recommended.  Likely/graciously the MRI techs agreed to stay late to do MRI.  Patient required to have x-ray of the orbits to rule out foreign object and this was normal.  MRI done with abnormal results as noted below including a nonenhancing \"bubbly\" T2 lesion within the right hippocampus, 0.6 cm cortical focus in the right paracentral lobule, and a 1.5 cm nonenhancing hyperintense lesion in the deep left parotid lobe.    I spoke with neurosurgery, Dr. Choudhury.  He reviewed the patient's images.  He feels that most likely this is a benign lesion.  He does recommend patient be evaluated by neurology and seen by neurosurgery.  Referrals sent.    I spoke at length with the patient about all of the findings.  He is going to follow-up as recommended.  He was encouraged to decrease his tobacco and caffeine use.  If he has any further symptoms he needs to return promptly.      Procedures      Results for orders placed or performed during the hospital encounter of 12/06/22 (from the past 24 hour(s))   CBC with platelets differential    Narrative    The following orders were created for panel order CBC with platelets differential.  Procedure                               Abnormality         Status                     ---------                               -----------         ------                     CBC with platelets and d...[[id number]]                      Final result                 Please view results for these tests on the individual orders.   Comprehensive metabolic panel   Result Value Ref Range    Sodium 136 133 - 144 mmol/L    Potassium 4.0 3.4 - 5.3 mmol/L    Chloride 104 94 - 109 " mmol/L    Carbon Dioxide (CO2) 26 20 - 32 mmol/L    Anion Gap 6 3 - 14 mmol/L    Urea Nitrogen 7 7 - 30 mg/dL    Creatinine 0.68 0.66 - 1.25 mg/dL    Calcium 8.8 8.5 - 10.1 mg/dL    Glucose 93 70 - 99 mg/dL    Alkaline Phosphatase 84 40 - 150 U/L    AST 24 0 - 45 U/L    ALT 25 0 - 70 U/L    Protein Total 7.1 6.8 - 8.8 g/dL    Albumin 3.7 3.4 - 5.0 g/dL    Bilirubin Total 0.5 0.2 - 1.3 mg/dL    GFR Estimate >90 >60 mL/min/1.73m2   CBC with platelets and differential   Result Value Ref Range    WBC Count 7.8 4.0 - 11.0 10e3/uL    RBC Count 5.13 4.40 - 5.90 10e6/uL    Hemoglobin 16.2 13.3 - 17.7 g/dL    Hematocrit 47.6 40.0 - 53.0 %    MCV 93 78 - 100 fL    MCH 31.6 26.5 - 33.0 pg    MCHC 34.0 31.5 - 36.5 g/dL    RDW 13.0 10.0 - 15.0 %    Platelet Count 247 150 - 450 10e3/uL    % Neutrophils 60 %    % Lymphocytes 25 %    % Monocytes 12 %    % Eosinophils 1 %    % Basophils 1 %    % Immature Granulocytes 1 %    NRBCs per 100 WBC 0 <1 /100    Absolute Neutrophils 4.7 1.6 - 8.3 10e3/uL    Absolute Lymphocytes 2.0 0.8 - 5.3 10e3/uL    Absolute Monocytes 0.9 0.0 - 1.3 10e3/uL    Absolute Eosinophils 0.1 0.0 - 0.7 10e3/uL    Absolute Basophils 0.1 0.0 - 0.2 10e3/uL    Absolute Immature Granulocytes 0.1 <=0.4 10e3/uL    Absolute NRBCs 0.0 10e3/uL   Head CT w/o contrast    Narrative    EXAM: CT HEAD W/O CONTRAST  LOCATION: [facility]  DATE/TIME: 12/6/2022 7:36 PM    INDICATION: seizure like activity  COMPARISON: None.  TECHNIQUE: Routine CT Head without IV contrast. Multiplanar reformats. Dose reduction techniques were used.    FINDINGS:  INTRACRANIAL CONTENTS: No intracranial hemorrhage, extraaxial collection, or mass effect.  No CT evidence of acute infarct. Right temporal horn is mildly dilated and slightly higher density than CSF. Otherwise normal parenchymal attenuation. Remainder of   the ventricular system is normal. Slight asymmetry of the cerebral peduncle with the left side more  "prominent, slightly distorting the interpeduncular cistern. This may be related to volume averaging versus adjacent mass.    VISUALIZED ORBITS/SINUSES/MASTOIDS: No intraorbital abnormality. No paranasal sinus mucosal disease. No middle ear or mastoid effusion.    BONES/SOFT TISSUES: No acute abnormality.      Impression    IMPRESSION:  1.  Moderately dilated temporal horn of the right lateral ventricle with its contents somewhat more hyperdense than CSF. Differential includes trapped temporal horn secondary to temporal lobe pathology versus intraventricular mass.   2.  Slight prominence of the left cerebral peduncle.  3.  MRI without and with IV contrast is recommended.   XR Eye Foreign Body    Narrative    EXAM: XR EYE FOREIGN BODY  LOCATION: [facility]  DATE/TIME: 12/6/2022 8:21 PM    INDICATION: Foreign body, pre MRI  COMPARISON: None.  TECHNIQUE: CR Orbits.      Impression    IMPRESSION: No unexpected retained radiopaque foreign body. Dental fillings.   MR Brain w/o & w Contrast    Narrative    EXAM: MR BRAIN W/O and W CONTRAST  LOCATION: [facility]  DATE/TIME: 12/6/2022 9:14 PM    INDICATION: Seizure like activity.  Abnormal CT scan.  COMPARISON: None.  CONTRAST: 6 mL Gadavist  TECHNIQUE: 1.5 Lenka multiplanar multisequence head MRI without and with intravenous contrast according to the seizure protocol.    FINDINGS:  INTRACRANIAL CONTENTS: Corresponding to hypodensity seen on CT, a nonenhancing \"bubbly\" T2/FLAIR-hyperintense, T1-hypointense lesion within the right hippocampal head and body measuring approximately 1.7 x 1.3 x 3.7 cm. No associated restricted diffusion   or hemorrhage. Asymmetrically abnormal T2 hyperintensity involving the right hippocampal tail. The adjacent choroid plexus appears separate from the lesion. Minimal resultant medialization of the medial right temporal lobe without russell herniation,   hydrocephalus, or midline shift. " "A 0.6 cm non-expansile cortical focus of T1-hyperintensity in the right paracentral lobule with corresponding FLAIR hyperintensity and without associated mass effect or surrounding edema (series 102 image 16). No acute or   subacute infarct. No acute intracranial hemorrhage or extra-axial fluid collection. Several nonspecific T2/FLAIR hyperintensities within the cerebral white matter, most consistent with mild chronic microvascular ischemic change. Normal ventricles and   sulci.  Normal position of the cerebellar tonsils.     SELLA: No abnormality accounting for technique.    OSSEOUS STRUCTURES/SOFT TISSUES: Normal marrow signal. A 1.5 cm nonenhancing non-diffusion restricting T2-hyperintense lesion within the deep lobe of the left parotid gland (series 8 image 18). The major intracranial vascular flow voids are maintained.     ORBITS: No abnormality accounting for technique.     SINUSES/MASTOIDS: No significant paranasal sinus or mastoid mucosal disease.       Impression    IMPRESSION:  1.  A nonenhancing \"bubbly\" T2-hyperintense lesion within the right hippocampus corresponding to findings on same-day CT without significant mass effect, edema, or hydrocephalus. Differential diagnoses include desmoplastic neuroepithelial tumor (DNET) or   multinodular and vacuolating neuronal tumor (MVNT; although location is somewhat atypical), among other etiologies.  2.  A 0.6 cm non-masslike cortical focus of T1 and FLAIR hyperintensity in the right paracentral lobule, indeterminate although may represent nonspecific mineralization. Attention on follow-up.  3.  An indeterminate 1.5 cm nonenhancing T2-hyperintense lesion within the deep left parotid lobe. Correlate with tissue sampling.       Medications   gadobutrol (GADAVIST) injection 7.5 mL (6 mLs Intravenous Given 12/6/22 2059)       Assessments & Plan      I have reviewed the findings, diagnosis, plan and need for follow up with the patient.    Discharge Medication List as " of 12/6/2022 11:07 PM          Final diagnoses:   Brain mass   Witnessed seizure-like activity (H)     Disposition: Patient discharged home in stable condition.  Plan as above.  Return for concerns.     Note: Chart documentation done in part with Dragon Voice Recognition software. Although reviewed after completion, some word and grammatical errors may remain.     12/6/2022   [facility] EMERGENCY DEPT     Irene Hurd MD  12/07/22 033

## 2022-12-20 ENCOUNTER — VIRTUAL VISIT (OUTPATIENT)
Dept: NEUROSURGERY | Facility: CLINIC | Age: 52
End: 2022-12-20
Payer: COMMERCIAL

## 2022-12-20 DIAGNOSIS — R56.9 SEIZURE (H): Primary | ICD-10-CM

## 2022-12-20 DIAGNOSIS — G93.89 BRAIN MASS: ICD-10-CM

## 2022-12-20 PROCEDURE — 99205 OFFICE O/P NEW HI 60 MIN: CPT | Mod: 95 | Performed by: NEUROLOGICAL SURGERY

## 2022-12-20 NOTE — PROGRESS NOTES
Video-Visit Details    Type of service:  Video Visit    Video Start Time (time video started): 4:45 PM    Video End Time (time video stopped): 5:45 PM    Originating Location (pt. Location): Home        Distant Location (provider location):  Off-site    Mode of Communication:  Video Conference via [employer]      Neurosurgery Clinic Note    Reason for Visit: follow up seizure with mass    History of Present Illness  Dewey Sultana is a 52 year old gentleman with a history of tobacco use and caffeine intake who presented to the ED on 12/6 after a seizure. He went to be the night before with significant stress and hemorrhoid pain. His daughter found him at 3 AM unconscious making gasping noises. He was evaluated in the ED where his exam was consistent with a postictal state but he recovered well. An MRI was ordered, which revealed an abnormality around his Right hippocampus. Once he recovered he went home and has done well since. He is not currently taking any antiseizure medications, and he has not had any other similar episodes.      When asked about retrospectively reviewing any recent symptoms that could be consistent with seizures, he recounts that he does have episodes or clusters of episodes where he feels like the wind is knocked out of him. He hasn't had such an episode for a couple of months, but previously they could happen a couple of times per day. He thinks they mostly happen during the summer months. The first time he remembers experiencing such a phenomenon as 15-16 years ago while removing wallpaper removal with a powerful solvent. He relents that it is kind of an anxious feeling.    Otherwise, he has no had any other medical problems. He believes he has poor circulation in his fingers, which are cold all the time. He has had some back pain after obtaining a new memory foam mattress.    He knows of no other periods where there were gaps in time, soreness in the morning, or  unusual smells, tastes, or feelings. He does not believe he has ever had a seizure aside from this episode.      Past Medical History:   Diagnosis Date     Depressive disorder, not elsewhere classified      Excessive caffeine intake 12/6/2022     Unspecified hemorrhoids without mention of complication     Hemorrhoids       Past Surgical History:   Procedure Laterality Date     COLONOSCOPY  07/07/2006     HC ANOSCOPY W BIOPSY SINGLE/MULTIPLE  06/09/06    Large internal hemorrhoids with a component of rectal prolapse     HC INCISION TENDON SHEATH FINGER      Trigger finger release, right.       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  Social History     Socioeconomic History     Marital status: Single     Spouse name: Not on file     Number of children: Not on file     Years of education: Not on file     Highest education level: Not on file   Occupational History     Not on file   Tobacco Use     Smoking status: Every Day     Packs/day: 1.00     Years: 20.00     Pack years: 20.00     Types: Cigarettes     Passive exposure: Current     Smokeless tobacco: Never   Vaping Use     Vaping Use: Never used   Substance and Sexual Activity     Alcohol use: Yes     Drug use: No     Sexual activity: Yes     Partners: Female   Other Topics Concern     Not on file   Social History Narrative     Not on file     Allergies   Allergen Reactions     No Known Drug Allergies        Current Outpatient Medications   Medication     atorvastatin (LIPITOR) 20 MG tablet     amoxicillin-clavulanate (AUGMENTIN) 875-125 MG tablet     clobetasol (TEMOVATE) 0.05 % external cream     tiotropium (SPIRIVA) 18 MCG inhaled capsule     No current facility-administered medications for this visit.       PE: On video today, Dewey had some unusual rhythmic mouth movements concerning for tardive dyskinesia.    ROS: 10 point ROS neg other than  the symptoms noted above in the HPI.         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 He believes that his seizure was provoked by highly stressful circumstances, significant caffeine intake, and poor sleep. This is somewhat unusual being in the middle of the night, but he has not had any other episodes since. 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 We discussed that while we do not know for sure, since that would require biopsy, it resembles in appearance a benign tumor. We discussed that we will need to repeat his images in 6 months to look for any change and carefully monitor him for any further seizures. We discussed that [place] state law prohibits him from driving for 3 months since last seizure. In addition, we discussed that he would visit with my colleague, Dr. Casey Collins, who will be able to follow him for seizures. Right now it seems like he only has had one seizure, but Dr. Collins can help him figure out if there is more or less concern for his seemingly unrelated symptoms. In addition, Dr. Collins might be able to get a better look at Dewey's mouth movements that I observed on video today. Dewey was unable to see or really feel these movements, and he has no history of taking medications that could cause TD. We discussed the role of surgery being for resection if his lesion grows in size or we suspect is leading to intractable epilepsy, for which he does not currently have.      - Visit with Casey Collins  - MRI with and without contrast of the brain in 6 months with telephone followup      Luis Choudhury MD  Department of Neurosurgery  [facility]

## 2022-12-20 NOTE — NURSING NOTE
Chief Complaint   Patient presents with     Video Visit     Return video visit for follow up      .Dewey Sultana  is being evaluated via a billable video visit.      How would you like to obtain your AVS? [employer]  For the video visit, send the invitation by: Text to cell phone: [phone]  Will anyone else be joining your video visit? No

## 2022-12-20 NOTE — LETTER
12/20/2022       RE: Dewey Sultana  [address]  [facility] [address]     Dear Colleague,    Thank you for referring your patient, Dewey Sultana, to the [facility] [place] at [facility]. Please see a copy of my visit note below.    Video-Visit Details    Type of service:  Video Visit    Video Start Time (time video started): 4:45 PM    Video End Time (time video stopped): 5:45 PM    Originating Location (pt. Location): Home        Distant Location (provider location):  Off-site    Mode of Communication:  Video Conference via Voucherlink    Bobbilynn Grossaint      Neurosurgery Clinic Note    Reason for Visit: follow up seizure with mass    History of Present Illness  Dewey Sultana is a 52 year old gentleman with a history of tobacco use and caffeine intake who presented to the ED on 12/6 after a seizure. He went to be the night before with significant stress and hemorrhoid pain. His daughter found him at 3 AM unconscious making gasping noises. He was evaluated in the ED where his exam was consistent with a postictal state but he recovered well. An MRI was ordered, which revealed an abnormality around his Right hippocampus. Once he recovered he went home and has done well since. He is not currently taking any antiseizure medications, and he has not had any other similar episodes.      When asked about retrospectively reviewing any recent symptoms that could be consistent with seizures, he recounts that he does have episodes or clusters of episodes where he feels like the wind is knocked out of him. He hasn't had such an episode for a couple of months, but previously they could happen a couple of times per day. He thinks they mostly happen during the summer months. The first time he remembers experiencing such a phenomenon as 15-16 years ago while removing wallpaper removal with a powerful solvent. He relents that it is  kind of an anxious feeling.    Otherwise, he has no had any other medical problems. He believes he has poor circulation in his fingers, which are cold all the time. He has had some back pain after obtaining a new memory foam mattress.    He knows of no other periods where there were gaps in time, soreness in the morning, or unusual smells, tastes, or feelings. He does not believe he has ever had a seizure aside from this episode.      Past Medical History:   Diagnosis Date     Depressive disorder, not elsewhere classified      Excessive caffeine intake 12/6/2022     Unspecified hemorrhoids without mention of complication     Hemorrhoids       Past Surgical History:   Procedure Laterality Date     COLONOSCOPY  07/07/2006     HC ANOSCOPY W BIOPSY SINGLE/MULTIPLE  06/09/06    Large internal hemorrhoids with a component of rectal prolapse     HC INCISION TENDON SHEATH FINGER      Trigger finger release, right.       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  Social History     Socioeconomic History     Marital status: Single     Spouse name: Not on file     Number of children: Not on file     Years of education: Not on file     Highest education level: Not on file   Occupational History     Not on file   Tobacco Use     Smoking status: Every Day     Packs/day: 1.00     Years: 20.00     Pack years: 20.00     Types: Cigarettes     Passive exposure: Current     Smokeless tobacco: Never   Vaping Use     Vaping Use: Never used   Substance and Sexual Activity     Alcohol use: Yes     Drug use: No     Sexual activity: Yes     Partners: Female   Other Topics Concern     Not on file   Social History Narrative     Not on file     Allergies   Allergen Reactions     No Known Drug Allergies        Current Outpatient Medications   Medication     atorvastatin (LIPITOR) 20 MG tablet     amoxicillin-clavulanate  (AUGMENTIN) 875-125 MG tablet     clobetasol (TEMOVATE) 0.05 % external cream     tiotropium (SPIRIVA) 18 MCG inhaled capsule     No current facility-administered medications for this visit.       PE: On video today, Dewey had some unusual rhythmic mouth movements concerning for tardive dyskinesia.    ROS: 10 point ROS neg other than the symptoms noted above in the HPI.         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 He believes that his seizure was provoked by highly stressful circumstances, significant caffeine intake, and poor sleep. This is somewhat unusual being in the middle of the night, but he has not had any other episodes since. 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 We discussed that while we do not know for sure, since that would require biopsy, it resembles in appearance a benign tumor. We discussed that we will need to repeat his images in 6 months to look for any change and carefully monitor him for any further seizures. We discussed that [place] state law prohibits him from driving for 3 months since last seizure. In addition, we discussed that he would visit with my colleague, Dr. Casey Collins, who will be able to follow him for seizures. Right now it seems like he only has had one seizure, but Dr. Collins can help him figure out if there is more or less concern for his seemingly unrelated symptoms. In addition, Dr. Collins might be able to get a better look at Dewey's mouth movements that I observed on video today. Dewey was unable to see or really feel these movements, and he has no history of taking medications that could cause TD. We discussed the role of surgery being for  resection if his lesion grows in size or we suspect is leading to intractable epilepsy, for which he does not currently have.      - Visit with Casey Collins  - MRI with and without contrast of the brain in 6 months with telephone followup          Again, thank you for allowing me to participate in the care of your patient.      Sincerely,    Luis Choudhury MD

## 2023-01-25 ENCOUNTER — HOSPITAL ENCOUNTER (EMERGENCY)
Facility: CLINIC | Age: 53
Discharge: HOME | End: 2023-01-25
Attending: EMERGENCY MEDICINE | Admitting: EMERGENCY MEDICINE
Payer: COMMERCIAL

## 2023-01-25 ENCOUNTER — TELEPHONE (OUTPATIENT)
Dept: NEUROSURGERY | Facility: CLINIC | Age: 53
End: 2023-01-25
Payer: COMMERCIAL

## 2023-01-25 VITALS
DIASTOLIC BLOOD PRESSURE: 88 MMHG | HEART RATE: 98 BPM | TEMPERATURE: 98.2 F | SYSTOLIC BLOOD PRESSURE: 153 MMHG | WEIGHT: 135 LBS | OXYGEN SATURATION: 97 % | BODY MASS INDEX: 22.47 KG/M2

## 2023-01-25 DIAGNOSIS — R56.9 SEIZURE-LIKE ACTIVITY (H): ICD-10-CM

## 2023-01-25 LAB — GLUCOSE BLDC GLUCOMTR-MCNC: 193 MG/DL (ref 70–99)

## 2023-01-25 PROCEDURE — 99283 EMERGENCY DEPT VISIT LOW MDM: CPT | Performed by: EMERGENCY MEDICINE

## 2023-01-25 PROCEDURE — 82962 GLUCOSE BLOOD TEST: CPT

## 2023-01-25 ASSESSMENT — ACTIVITIES OF DAILY LIVING (ADL): ADLS_ACUITY_SCORE: 35

## 2023-01-25 NOTE — TELEPHONE ENCOUNTER
[facility] Call Center    Phone Message    May a detailed message be left on voicemail: yes     Reason for Call: Other: Patient is calling regarding some odd sensations he has been having and wanted to discuss with care team. Please call back when available.      Action Taken: Other: Neurosurgery    Travel Screening: Not Applicable

## 2023-01-25 NOTE — TELEPHONE ENCOUNTER
"Called pt back and he indicated he is currently at the [facility] ED. Pt said he was in a meeting this afternoon with colleagues eating pizza when he had an approximately 1 minute episode where he had a sensation of the \"wind being knocked out of me with the sensation of being on a roller coaster, like gravity moving my insides around.\" His coworkers noticed that his speech was slurred, his eye was twitching and that his temple was pulsing.\" The pt was not aware of the speech, eye twitching or pulsing.     Pt could not identify any sort of trigger before the event. It was not a stressful day. He said he has not slept well recently. He consumes 8 20-oz bottles of diet Mountain Dew daily.     Pt previously presented to the ED on 12/6 for seizure. 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 Pt scheduled for f/u imaging in June and with Dr. Collins for seizure f/u in February.     Will notify Dr. Choudhury and look for ED provider recommendations.   "

## 2023-01-25 NOTE — ED PROVIDER NOTES
History     Chief Complaint   Patient presents with     Slurred Speech     HPI  Dewey Sultana is a 52 year old male who presents after an episode at work where he had some slurred speech and his left eye was twitching.  These episodes have been ongoing for years.  No weakness.  He does have a brain lesion he tells me.  He has seen a brain doctor.  Also with these episodes he will feel like he has the wind knocked out of him.    In looking through his chart.  He was seen by neurosurgery who recommended follow-up MRI in 6 months time.  Also referred her to neurology to discuss possible anticonvulsant.  Appointment is scheduled in 2 weeks    Allergies:  Allergies   Allergen Reactions     No Known Drug Allergies        Problem List:    Patient Active Problem List    Diagnosis Date Noted     Excessive caffeine intake 12/06/2022     Priority: Medium     CARDIOVASCULAR SCREENING; LDL GOAL LESS THAN 130 04/08/2013     Priority: Medium     Depressive disorder, not elsewhere classified 12/13/2007     Priority: Medium     Tobacco use disorder 06/01/2007     Priority: Medium     Esophageal reflux 06/01/2007     Priority: Medium        Past Medical History:    Past Medical History:   Diagnosis Date     Depressive disorder, not elsewhere classified      Excessive caffeine intake 12/6/2022     Unspecified hemorrhoids without mention of complication        Past Surgical History:    Past Surgical History:   Procedure Laterality Date     COLONOSCOPY  07/07/2006     HC ANOSCOPY W BIOPSY SINGLE/MULTIPLE  06/09/06    Large internal hemorrhoids with a component of rectal prolapse     HC INCISION TENDON SHEATH FINGER      Trigger finger release, right.       Family History:    Family History   Problem Relation Age of Onset     Diabetes Brother      Alcohol/Drug Brother        Social History:  Marital Status:  Single [1]  Social History     Tobacco Use     Smoking status: Every Day     Packs/day: 1.00     Years: 20.00     Pack years:  "20.00     Types: Cigarettes     Passive exposure: Current     Smokeless tobacco: Never   Vaping Use     Vaping Use: Never used   Substance Use Topics     Alcohol use: Yes     Drug use: No        Medications:    amoxicillin-clavulanate (AUGMENTIN) 875-125 MG tablet  atorvastatin (LIPITOR) 20 MG tablet  clobetasol (TEMOVATE) 0.05 % external cream  tiotropium (SPIRIVA) 18 MCG inhaled capsule          Review of Systems  All other systems are reviewed and are negative    Physical Exam   BP: (!) 153/88  Pulse: 98  Temp: 98.2  F (36.8  C)  Weight: 61.2 kg (135 lb)  SpO2: 97 %      Physical Exam  Vitals and nursing note reviewed.   Constitutional:       General: He is not in acute distress.     Appearance: He is well-developed. He is not diaphoretic.   HENT:      Head: Normocephalic and atraumatic.   Eyes:      General: No scleral icterus.        Right eye: No discharge.         Left eye: No discharge.      Conjunctiva/sclera: Conjunctivae normal.   Cardiovascular:      Rate and Rhythm: Normal rate and regular rhythm.      Heart sounds: Normal heart sounds. No murmur heard.  Pulmonary:      Effort: Pulmonary effort is normal. No respiratory distress.      Breath sounds: Normal breath sounds. No stridor.   Abdominal:      Palpations: Abdomen is soft.      Tenderness: There is no abdominal tenderness.   Musculoskeletal:         General: Normal range of motion.      Cervical back: Normal range of motion and neck supple.   Skin:     General: Skin is warm and dry.      Coloration: Skin is not pale.      Findings: No erythema () or rash.   Neurological:      General: No focal deficit present.      Mental Status: He is alert.      Motor: No weakness or abnormal muscle tone.      Coordination: Coordination normal.         ED Course                 Procedures              Critical Care time:  none     MRI brain last month:  IMPRESSION:  1.  A nonenhancing \"bubbly\" T2-hyperintense lesion within the right hippocampus corresponding to " findings on same-day CT without significant mass effect, edema, or hydrocephalus. Differential diagnoses include desmoplastic neuroepithelial tumor (DNET) or   multinodular and vacuolating neuronal tumor (MVNT; although location is somewhat atypical), among other etiologies.  2.  A 0.6 cm non-masslike cortical focus of T1 and FLAIR hyperintensity in the right paracentral lobule, indeterminate although may represent nonspecific mineralization. Attention on follow-up.  3.  An indeterminate 1.5 cm nonenhancing T2-hyperintense lesion within the deep left parotid lobe. Correlate with tissue sampling.      12/20/22:  Assessment and Plan   Dewey Sultana is a 52 year old gentleman with minimal medical history who had a first time seizure and an MRI consistent with a DNET. He believes that his seizure was provoked by highly stressful circumstances, significant caffeine intake, and poor sleep. This is somewhat unusual being in the middle of the night, but he has not had any other episodes since. 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 We discussed that while we do not know for sure, since that would require biopsy, it resembles in appearance a benign tumor. We discussed that we will need to repeat his images in 6 months to look for any change and carefully monitor him for any further seizures. We discussed that [place] state law prohibits him from driving for 3 months since last seizure. In addition, we discussed that he would visit with my colleague, Dr. Casey Collins, who will be able to follow him for seizures. Right now it seems like he only has had one seizure, but Dr. Collins can help him figure out if there is more or less concern for his seemingly unrelated symptoms. In addition, Dr. Collins might be able to get a better look at Dewey's mouth movements that I observed on video today. Dewey was unable to see or really feel these movements, and he has no  history of taking medications that could cause TD. We discussed the role of surgery being for resection if his lesion grows in size or we suspect is leading to intractable epilepsy, for which he does not currently have.        - Visit with Casey Collins  - MRI with and without contrast of the brain in 6 months with telephone followup        Luis Choudhury MD  Department of Neurosurgery  [facility]       Results for orders placed or performed during the hospital encounter of 01/25/23 (from the past 24 hour(s))   Glucose by meter   Result Value Ref Range    GLUCOSE BY METER POCT 193 (H) 70 - 99 mg/dL       Medications - No data to display    Assessments & Plan (with Medical Decision Making)  52-year-old male with some recurrent episodes over the last 15 years where he feels like he gets the wind knocked out of him, has some slurred speech and occasional twitching around the left eye.  This happened again today.  He has back to baseline.  He does have a benign-appearing brain lesion as referenced above.  He has a scheduled appointment with neurology in 3 weeks.  He appears stable to keep that appointment as scheduled.  Return anytime sooner if condition worsens or other concerns     I have reviewed the nursing notes.    I have reviewed the findings, diagnosis, plan and need for follow up with the patient.               New Prescriptions    No medications on file       Final diagnoses:   Seizure-like activity (H)       1/25/2023   [facility] EMERGENCY DEPT     Erick Fallon MD  01/25/23 0239

## 2023-01-25 NOTE — ED TRIAGE NOTES
"Patient on his phone with his \"brain doctor\" when called back to triage. He did stay on his phone and questions answered by his daughter. He had an episode of sudden \"wind knocked out of me\" , slurred speech, and eye twitching.     Triage Assessment     Row Name 01/25/23 1443       Triage Assessment (Adult)    Airway WDL WDL       Respiratory WDL    Respiratory WDL WDL       Skin Circulation/Temperature WDL    Skin Circulation/Temperature WDL WDL       Cardiac WDL    Cardiac WDL WDL       Peripheral/Neurovascular WDL    Peripheral Neurovascular WDL WDL       Cognitive/Neuro/Behavioral WDL    Cognitive/Neuro/Behavioral WDL WDL              "

## 2023-01-25 NOTE — TELEPHONE ENCOUNTER
Writer routed to Neurosurgery Nurse Pool   Attn: Sarahi Vang, RNCC   Patient requesting call back about symptoms      Madonna Sultana LPN  Neurosurgery

## 2023-02-07 NOTE — RESULT ENCOUNTER NOTE
"Please inform the patient that the leadless EKG returned grossly unremarkable. There was 1 run of supraventricular tachycardia, but otherwise normal sinus rhythm of the heart. Supraventricular tachycardia, also called \"SVT,\" is a heartbeat that is faster than normal. It usually starts and stops suddenly, without warning.     I do not feel that his ongoing shortness of breath is due to the 1 run of SVT. I am more suspicious of his everyday smoking. I have sent out an inhaler for the patient to use daily to help improve his breathing. This is called spiriva (tiotropium).     Cristopher Duarte PA-C on 10/4/2022 at 10:31 PM        "
No

## 2023-02-12 ENCOUNTER — HEALTH MAINTENANCE LETTER (OUTPATIENT)
Age: 53
End: 2023-02-12

## 2023-02-15 ENCOUNTER — OFFICE VISIT (OUTPATIENT)
Dept: NEUROLOGY | Facility: CLINIC | Age: 53
End: 2023-02-15
Attending: NEUROLOGICAL SURGERY
Payer: COMMERCIAL

## 2023-02-15 VITALS
TEMPERATURE: 97.3 F | HEIGHT: 65 IN | HEART RATE: 63 BPM | WEIGHT: 135 LBS | DIASTOLIC BLOOD PRESSURE: 83 MMHG | SYSTOLIC BLOOD PRESSURE: 127 MMHG | BODY MASS INDEX: 22.49 KG/M2

## 2023-02-15 DIAGNOSIS — G40.219 PARTIAL EPILEPSY WITH LOSS OF CONSCIOUSNESS, INTRACTABLE (H): Primary | ICD-10-CM

## 2023-02-15 RX ORDER — LEVETIRACETAM 500 MG/1
500 TABLET ORAL 2 TIMES DAILY
Qty: 60 TABLET | Refills: 11 | Status: SHIPPED | OUTPATIENT
Start: 2023-02-15 | End: 2023-05-17

## 2023-02-15 ASSESSMENT — PAIN SCALES - GENERAL: PAINLEVEL: NO PAIN (0)

## 2023-02-15 NOTE — PROGRESS NOTES
Preventive Care:    Colorectal Cancer Screening: During our visit today, we discussed that it is recommended you receive colorectal cancer screening. Please call or make an appointment with your primary care provider to discuss this. You may also call the [employer] scheduling line ([phone]) to set up a colonoscopy appointment.  Hiral Whitman M.A.

## 2023-02-15 NOTE — LETTER
2/15/2023     RE: Dewey Sultana  : 1970   MRN: [id number]      Dear Colleague,    Thank you for referring your patient, Dewey Sultana, to the [facility] EPILEPSY CARE at [facility]. Please see a copy of my visit note below.    Preventive Care:    Colorectal Cancer Screening: During our visit today, we discussed that it is recommended you receive colorectal cancer screening. Please call or make an appointment with your primary care provider to discuss this. You may also call the [facility] scheduling line ([phone]) to set up a colonoscopy appointment.  Hiral Whitman M.A.       [facility]psy [facility]:  NEW PATIENT EVALUATION         Service Date: 02/15/2023     HISTORY:  Mr. Dewey Sultana is a 52-year-old, right-handed man who was referred for consultation regarding epileptic seizures.  He was able to provide detailed medical history.  He was accompanied by his daughter, who described her observations during his convulsive and nonconvulsive seizures.  I reviewed medical records on the [facility] chart.     Ictal semiology-history:   The patient has been having stereotyped paroxysmal events with primarily subjective phenomena since , and he had a first and only generalized tonic-clonic seizure in 2022.     The nonconvulsive events have been going on for at least several times per year since onset in .  At times, they were significantly more frequent, up to twice per day for brief periods.  The most recent of these occurred during the last week of 2023.  These always began with a peculiar, but highly stereotyped mental feeling that he has just been startled and is physically unsettled.  He then feels compelled to take repeated deep breaths, although he never feels short of breath.  This lasts for approximately 1 minute, after which he returns to baseline, with no apparent postictal state.  His daughter has seen a number of these events,  and she consistently notes that he has slurred speech, although speech content is not confused, and he often has left periocular twitching repeatedly, during the phase where he is taking repeated deep breaths.  Recently, a coworker told him that during one of these events, the coworker also witnessed left periocular twitching and slurred speech.  The patient himself is not aware of left facial movements or speech alteration.     The patient had a witnessed generalized tonic-clonic seizure on 2022.  His daughter heard noises in his bedroom, and went in to observe him to be stiffly extended with generalized jerking for about a minute.  Afterwards, he was quite confused for many minutes and then mildly confused for another hour.  He was found to have bitten his tongue with blood in his mouth.  Approximately 5 days later, he went to the emergency room for a consultation regarding this episode, at which time he had CT scanning and then a brain MRI, which showed the right hippocampal lesion.     The patient denied that he has ever had a non-convulsive falling with unconsciousness, repetitive involuntary movements or posturing, sudden brief confusion, or other paroxysmal phenomena.  He denied any history of sudden involuntary jerks while fully awake, but he has had hypnic jerks.      The patient does not recognize exacerbating or remitting factors with regard to seizure frequency.      Epilepsy-seizure predispositions:   The patient has no family history of epilepsy or seizures.  He has no history of gestational or  injury, febrile convulsions, developmental delay, stroke, meningitis, encephalitis, significant head injury, or other epileptic predispositions except for a brain MRI lesion.      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  The brain MRI also showed a cortical site of signal alteration in the right paracentral lobule and another area of signal alteration in the left parietal subcortical white matter.     The patient had not had electroencephalography as of the time of his initial epilepsy consultation.    Epilepsy therapeutics:   The patient has not used anti-seizure medications prior to February 2023.    PAST MEDICAL-SURGICAL HISTORY:    1.  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    2.  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  3.  History of hyperlipidemia.    FAMILY HISTORY:  There is no family history of seizures or epilepsy, or of other neurological conditions.      PERSONAL AND SOCIAL HISTORY:  The patient grew up in [place].  He graduated from high school in regular classes.  He has worked in multiple positions, currently in construction of precision measurement equipment.  He is .  He lives with his teenage daughter.  He smokes about 1 pack of cigarettes per day.  He does not use ethanol or illicit recreational drugs.    REVIEW OF SYSTEMS:  The patient denied history of weakness, tremors or other abnormal involuntary movements, numbness or tingling, incoordination, falling or difficulty in walking, urinary or fecal incontinence, headache and other pain, except as described above.  The patient denied any history of severe depression or suicidal ideation, severe anxiety, panic attacks, hallucinations, delusions, psychosis, substance abuse, or psychiatric hospitalization.  He denied rashes and easy bruising.  The remainder of a 14-system symptom review was negative.    MEDICATIONS:  Atorvastatin 20 mg daily and other medications as per the electronic medical record.    PHYSICAL EXAMINATION:    On examination, the patient was an  alert man in no apparent distress.  Pulse 63, blood pressure 127/83, respirations 18 per minute.  Skull was normocephalic and atraumatic.  Neck was supple, without signs of meningeal irritation.       On neurological examination, the patient appeared alert and was fully oriented to person, place, time, and reason for visit.  Speech showed normal articulation, fluency, repetitions, naming, syntax and comprehension.  He accurately repeated digits sequences, repeating 8 forward and 5 reversed.  At 10 minutes from presentation, 4 of 4 phrases were recalled.  No apraxias or atavistic signs were elicited.  Cranial nerves III through XII were normal.  Muscle masses, tones and strengths were normal throughout.  There was no pronator drift.  Sequential fine finger movements were performed normally with each hand.  No spontaneous tremors, myoclonus, or other abnormal movements were observed.  Sensations of light touch, pin prick, vibration and proprioception were reportedly normal throughout.  The rapid alternating movements, and finger-nose-finger and heel-shin maneuvers were performed normally bilaterally.  Romberg maneuver was negative.  Regular, heel, toe, tandem and reverse tandem walking were normal.  Deep tendon reflexes were normal and symmetric throughout.  Toes were downgoing bilaterally.      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  His neurosurgeon, Dr. Luis Choudhury, is planning to obtain a high-resolution brain MRI study subsequently.     The patient gives a history of highly stereotyped episodes that almost certainly represent focal seizures of limbic system origin.  Since he does not recall speech impairment and left facial twitching that observers note, it may be suspected that he has some degree of memory impairment during these events.  He has had a well described generalized tonic-clonic  seizure.  I would like to proceed with an outpatient electroencephalogram to determine the degree of any interictal electrocerebral dysfunction.  He would like to proceed with this.     Given the right hippocampal lesion and the highly stereotyped paroxysmal events, I recommended starting an anti-seizure medication.  We reviewed possible adverse effects of levetiracetam, and he would like to start this.     Dr. Choudhury already discussed [place] regulations on seizures and driving with the patient.  I also reviewed [place] regulations on seizures and driving with the patient.  He appeared to clearly understand that he is prohibited from operating a motor vehicle within 3 months following any seizure or other episode with sudden unconsciousness or inability to sit up, and that he is required to report any future such seizure to the [facility] within 30 days after the event.  I also recommended that he should review all of his other activities, and avoid any activities that might lead to self-injury or injury of others, within 3 months following any seizure with impaired awareness or impaired motor control.  Such activities include but are not limited to operating power cutting or other power tools, handling firearms, exposure to heights from which he might fall, exposure to vessels with hot cooking oil or water, and tub-bathing or swimming alone.      PLAN:    1.  Start levetiracetam 500 mg b.i.d.  2.  Outpatient 3-hour video EEG.  3.  Return visit in approximately 3 months.    I spent 78 minutes in this patient care, with 62 minutes in direct patient contact, and 16 minutes in chart review and document preparation on the day of the visit.       Aldo Collins M.D.    of Neurology     D: 02/15/2023   T: 02/15/2023   MT: [email or web address]  Name:     DAVID WHITNEYSandy  MRN:      [id number]        Account:      [id number]   :      1970           Service Date: 02/15/2023   Document: [id number]     impaired

## 2023-02-16 PROBLEM — G40.219 PARTIAL EPILEPSY WITH LOSS OF CONSCIOUSNESS, INTRACTABLE (H): Status: ACTIVE | Noted: 2023-02-16

## 2023-02-16 NOTE — PROGRESS NOTES
[facility] Epilepsy [facility]:  NEW PATIENT EVALUATION         Service Date: 02/15/2023     HISTORY:  Mr. Dewey Sultana is a 52-year-old, right-handed man who was referred for consultation regarding epileptic seizures.  He was able to provide detailed medical history.  He was accompanied by his daughter, who described her observations during his convulsive and nonconvulsive seizures.  I reviewed medical records on the [facility] chart.     Ictal semiology-history:   The patient has been having stereotyped paroxysmal events with primarily subjective phenomena since 2006, and he had a first and only generalized tonic-clonic seizure in December 2022.     The nonconvulsive events have been going on for at least several times per year since onset in 2006.  At times, they were significantly more frequent, up to twice per day for brief periods.  The most recent of these occurred during the last week of 01/2023.  These always began with a peculiar, but highly stereotyped mental feeling that he has just been startled and is physically unsettled.  He then feels compelled to take repeated deep breaths, although he never feels short of breath.  This lasts for approximately 1 minute, after which he returns to baseline, with no apparent postictal state.  His daughter has seen a number of these events, and she consistently notes that he has slurred speech, although speech content is not confused, and he often has left periocular twitching repeatedly, during the phase where he is taking repeated deep breaths.  Recently, a coworker told him that during one of these events, the coworker also witnessed left periocular twitching and slurred speech.  The patient himself is not aware of left facial movements or speech alteration.     The patient had a witnessed generalized tonic-clonic seizure on 12/01/2022.  His daughter heard noises in his bedroom, and went in to observe him to be stiffly extended with generalized jerking for about a  minute.  Afterwards, he was quite confused for many minutes and then mildly confused for another hour.  He was found to have bitten his tongue with blood in his mouth.  Approximately 5 days later, he went to the emergency room for a consultation regarding this episode, at which time he had CT scanning and then a brain MRI, which showed the right hippocampal lesion.     The patient denied that he has ever had a non-convulsive falling with unconsciousness, repetitive involuntary movements or posturing, sudden brief confusion, or other paroxysmal phenomena.  He denied any history of sudden involuntary jerks while fully awake, but he has had hypnic jerks.      The patient does not recognize exacerbating or remitting factors with regard to seizure frequency.      Epilepsy-seizure predispositions:   The patient has no family history of epilepsy or seizures.  He has no history of gestational or  injury, febrile convulsions, developmental delay, stroke, meningitis, encephalitis, significant head injury, or other epileptic predispositions except for a brain MRI lesion.      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  The brain MRI also showed a cortical site of signal alteration in the right paracentral lobule and another area of signal alteration in the left parietal subcortical white matter.     The patient had not had electroencephalography as of the time of his initial epilepsy consultation.    Epilepsy therapeutics:   The patient has not used anti-seizure medications prior to 2023.    PAST MEDICAL-SURGICAL HISTORY:    1.  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     2.  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  3.  History of hyperlipidemia.    FAMILY HISTORY:  There is no family history of seizures or epilepsy, or of other neurological conditions.      PERSONAL AND SOCIAL HISTORY:  The patient grew up in [place].  He graduated from high school in regular classes.  He has worked in multiple positions, currently in construction of precision measurement equipment.  He is .  He lives with his teenage daughter.  He smokes about 1 pack of cigarettes per day.  He does not use ethanol or illicit recreational drugs.    REVIEW OF SYSTEMS:  The patient denied history of weakness, tremors or other abnormal involuntary movements, numbness or tingling, incoordination, falling or difficulty in walking, urinary or fecal incontinence, headache and other pain, except as described above.  The patient denied any history of severe depression or suicidal ideation, severe anxiety, panic attacks, hallucinations, delusions, psychosis, substance abuse, or psychiatric hospitalization.  He denied rashes and easy bruising.  The remainder of a 14-system symptom review was negative.    MEDICATIONS:  Atorvastatin 20 mg daily and other medications as per the electronic medical record.    PHYSICAL EXAMINATION:    On examination, the patient was an alert man in no apparent distress.  Pulse 63, blood pressure 127/83, respirations 18 per minute.  Skull was normocephalic and atraumatic.  Neck was supple, without signs of meningeal irritation.       On neurological examination, the patient appeared alert and was fully oriented to person, place, time, and reason for visit.  Speech showed normal articulation, fluency, repetitions, naming, syntax and comprehension.  He accurately repeated digits sequences, repeating 8 forward and 5 reversed.  At 10 minutes from presentation, 4 of 4 phrases were recalled.  No apraxias or atavistic signs were elicited.  Cranial nerves III through XII were  normal.  Muscle masses, tones and strengths were normal throughout.  There was no pronator drift.  Sequential fine finger movements were performed normally with each hand.  No spontaneous tremors, myoclonus, or other abnormal movements were observed.  Sensations of light touch, pin prick, vibration and proprioception were reportedly normal throughout.  The rapid alternating movements, and finger-nose-finger and heel-shin maneuvers were performed normally bilaterally.  Romberg maneuver was negative.  Regular, heel, toe, tandem and reverse tandem walking were normal.  Deep tendon reflexes were normal and symmetric throughout.  Toes were downgoing bilaterally.      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  His neurosurgeon, Dr. Luis Choudhury, is planning to obtain a high-resolution brain MRI study subsequently.     The patient gives a history of highly stereotyped episodes that almost certainly represent focal seizures of limbic system origin.  Since he does not recall speech impairment and left facial twitching that observers note, it may be suspected that he has some degree of memory impairment during these events.  He has had a well described generalized tonic-clonic seizure.  I would like to proceed with an outpatient electroencephalogram to determine the degree of any interictal electrocerebral dysfunction.  He would like to proceed with this.     Given the right hippocampal lesion and the highly stereotyped paroxysmal events, I recommended starting an anti-seizure medication.  We reviewed possible adverse effects of levetiracetam, and he would like to start this.     Dr. Choudhury already discussed [place] regulations on seizures and driving with the patient.  I also reviewed [place] regulations on seizures and driving with the patient.  He appeared to clearly understand that he is prohibited from  operating a motor vehicle within 3 months following any seizure or other episode with sudden unconsciousness or inability to sit up, and that he is required to report any future such seizure to the DPS within 30 days after the event.  I also recommended that he should review all of his other activities, and avoid any activities that might lead to self-injury or injury of others, within 3 months following any seizure with impaired awareness or impaired motor control.  Such activities include but are not limited to operating power cutting or other power tools, handling firearms, exposure to heights from which he might fall, exposure to vessels with hot cooking oil or water, and tub-bathing or swimming alone.      PLAN:    1.  Start levetiracetam 500 mg b.i.d.  2.  Outpatient 3-hour video EEG.  3.  Return visit in approximately 3 months.    I spent 78 minutes in this patient care, with 62 minutes in direct patient contact, and 16 minutes in chart review and document preparation on the day of the visit.       Aldo Collins M.D.   Professor of Neurology       D: 02/15/2023   T: 02/15/2023   MT: [email or web address]    Name:     DAVID WHITNEY  MRN:      [id number]        Account:      [id number]   :      1970           Service Date: 02/15/2023       Document: [id number]

## 2023-03-22 ENCOUNTER — ANCILLARY PROCEDURE (OUTPATIENT)
Dept: NEUROLOGY | Facility: CLINIC | Age: 53
End: 2023-03-22
Attending: PSYCHIATRY & NEUROLOGY
Payer: COMMERCIAL

## 2023-03-22 DIAGNOSIS — G40.219 PARTIAL EPILEPSY WITH LOSS OF CONSCIOUSNESS, INTRACTABLE (H): ICD-10-CM

## 2023-03-30 ENCOUNTER — TELEPHONE (OUTPATIENT)
Dept: NEUROLOGY | Facility: CLINIC | Age: 53
End: 2023-03-30

## 2023-03-30 NOTE — TELEPHONE ENCOUNTER
What is the concern that needs to be addressed by a nurse?     Dewey Sultana is requesting a call back to discuss results of EEG completed on 03/22/22. Patient is scheduled for a follow up appointment with Dr. Collins on 05/17/23, writer offered to move patient's appointment to a sooner date to discuss results, patient declined to change appointment at this time would just like a call back to discuss    May a detailed message be left on voicemail? YES    Date of last office visit: 02/15/23    Message routed to: MINCEP RN

## 2023-03-31 NOTE — TELEPHONE ENCOUNTER
Patient reports he was able to review the [facility] from [facility]. He has no follow up questions.

## 2023-05-17 ENCOUNTER — OFFICE VISIT (OUTPATIENT)
Dept: NEUROLOGY | Facility: CLINIC | Age: 53
End: 2023-05-17
Payer: COMMERCIAL

## 2023-05-17 VITALS
WEIGHT: 132.4 LBS | SYSTOLIC BLOOD PRESSURE: 122 MMHG | BODY MASS INDEX: 22.06 KG/M2 | HEART RATE: 62 BPM | TEMPERATURE: 97.2 F | DIASTOLIC BLOOD PRESSURE: 76 MMHG | HEIGHT: 65 IN

## 2023-05-17 DIAGNOSIS — G40.219 PARTIAL EPILEPSY WITH LOSS OF CONSCIOUSNESS, INTRACTABLE (H): Primary | ICD-10-CM

## 2023-05-17 PROCEDURE — 80177 DRUG SCRN QUAN LEVETIRACETAM: CPT | Mod: ORL | Performed by: PSYCHIATRY & NEUROLOGY

## 2023-05-17 RX ORDER — LEVETIRACETAM 500 MG/1
500 TABLET ORAL 2 TIMES DAILY
Qty: 60 TABLET | Refills: 11 | Status: SHIPPED | OUTPATIENT
Start: 2023-05-17 | End: 2023-08-02

## 2023-05-17 NOTE — PROGRESS NOTES
[facility]:  RETURN VISIT          Service Date: 05/17/2023      HISTORY:  Mr. 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  He came alone to the visit today.    He reported that he has not had any seizures, loss of consciousness, or other paroxysmal phenomena, following the most recent visit to this clinic on 02/15/2023.      The only grand mal seizure occurred on 12/01/2022.  The most recent event with an aura and impaired speech occurred during the last week of 01/2023.      He denied adverse effects of levetiracetam, which he is taking twice daily as prescribed.       Ictal semiology-history:   The patient confirmed previously reported history today.  He confirmed that he was having stereotyped paroxysmal events with primarily subjective phenomena since 2006, and he had a first and only generalized tonic-clonic seizure in December 2022.     The non-convulsive events have been going on for at least several times per year since onset in 2006.  At times, they were significantly more frequent, up to twice per day for brief periods.  One occurred during the last week of 01/2023.  These always began with a peculiar, but highly stereotyped mental feeling that he has just been startled and is physically unsettled.  He then feels compelled to take repeated deep breaths, although he never feels short of breath.  This lasts for approximately 1 minute, after which he returns to baseline, with no apparent postictal state.  His daughter has seen a number of these events, and she consistently notes that he has slurred speech, although speech content is not confused, and he often has left periocular twitching repeatedly, during the phase where he is taking repeated deep breaths.  Recently, a coworker told him that during one of these events, the coworker also witnessed left periocular twitching and slurred speech.  The patient himself is not aware of  left facial movements or speech alteration.     The patient had a witnessed generalized tonic-clonic seizure on 2022.  His daughter heard noises in his bedroom, and went in to observe him to be stiffly extended with generalized jerking for about a minute.  Afterwards, he was quite confused for many minutes and then mildly confused for another hour.  He was found to have bitten his tongue with blood in his mouth.  Approximately 5 days later, he went to the emergency room for a consultation regarding this episode, at which time he had CT scanning and then a brain MRI, which showed the right hippocampal lesion.     The patient denied that he has ever had a non-convulsive falling with unconsciousness, repetitive involuntary movements or posturing, sudden brief confusion, or other paroxysmal phenomena.  He denied any history of sudden involuntary jerks while fully awake, but he has had hypnic jerks.       The patient does not recognize exacerbating or remitting factors with regard to seizure frequency.      Epilepsy-seizure predispositions:   The patient has no family history of epilepsy or seizures.  He has no history of gestational or  injury, febrile convulsions, developmental delay, stroke, meningitis, encephalitis, significant head injury, or other epileptic predispositions except for a brain MRI lesion.       Laboratory evaluations:   A 1.5 Lenka brain MRI was performed at 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  The brain MRI also showed a cortical site of signal alteration in the right paracentral lobule and another area of signal alteration in the left parietal subcortical white matter.     Outpatient 3hr VEEG was normal, at [facility] on 2023.      Epilepsy therapeutics:   The patient  started levetiracetam in February 2023.  He has not used other anti-seizure medications.       PAST MEDICAL-SURGICAL HISTORY:    1.  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    2.  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  3.  History of hyperlipidemia.     FAMILY HISTORY:  There is no family history of seizures or epilepsy, or of other neurological conditions.       PERSONAL AND SOCIAL HISTORY:  The patient grew up in [place].  He graduated from high school in regular classes.  He has worked in multiple positions, currently in construction of precision measurement equipment.  He is .  He lives with his teenage daughter.  He smokes about 1 pack of cigarettes per day.  He does not use ethanol or illicit recreational drugs.     REVIEW OF SYSTEMS:  The patient denied history of weakness, tremors or other abnormal involuntary movements, numbness or tingling, incoordination, falling or difficulty in walking, urinary or fecal incontinence, headache and other pain, except as described above.  The patient denied any history of severe depression or suicidal ideation, severe anxiety, panic attacks, hallucinations, delusions, psychosis, substance abuse, or psychiatric hospitalization.  He denied rashes and easy bruising.  The remainder of a 14-system symptom review was negative.     MEDICATIONS:  Levetiracetam 500 mg b.i.d., atorvastatin, and other medications as per the electronic medical record.     PHYSICAL EXAMINATION:    On examination, the patient was an alert man in no apparent distress.  Vital signs were as per the electronic medical record.  Skull was normocephalic and atraumatic.  Neck was supple, without signs of meningeal irritation.                                             On neurological examination, the patient appeared alert and was fully oriented to person, place, time, and reason for visit.  Speech showed normal  articulation, fluency, repetitions, naming, syntax and comprehension.   Cranial nerves III through XII were normal.  Muscle masses, tones and strengths were normal throughout.  There was no pronator drift.  Sequential fine finger movements were performed normally with each hand.  No spontaneous tremors, myoclonus, or other abnormal movements were observed.  Sensations of light touch were reportedly normal throughout.  The rapid alternating movements, and finger-nose-finger and heel-shin maneuvers were performed normally bilaterally.  Romberg maneuver was negative.  Regular, heel, toe, tandem and reverse tandem walking were normal.  Deep tendon reflexes were normal and symmetric throughout.  Toes were downgoing bilaterally.       IMPRESSION:    The patient did well with initiation of levetiracetam, with no subsequent seizures and no apparent adverse effects.  We will check on initial level today.      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  His neurosurgeon, Dr. Luis Choudhury, is planning to obtain a high-resolution brain MRI study subsequently.     We reviewed the results of the EEG, which was normal.      The patient gives a history of highly stereotyped episodes that almost certainly represent focal seizures of limbic system origin.  Since he does not recall speech impairment and left facial twitching that observers note, it may be suspected that he has some degree of memory impairment during these events.  He has had a single generalized tonic-clonic seizure.       I reviewed [place] regulations on seizures and driving with the patient.  He appeared to clearly understand that he would be prohibited from operating a motor vehicle within 3 months following any future seizure or other episode with sudden unconsciousness or inability to sit up, and that he is required to report any future such seizure to the [facility] within 30 days  after the event.       PLAN:    1.  Continue levetiracetam at the current dose.  2.  Check levetiracetam level today.  3.  Return video visit in approximately 6 months.     I spent 46 minutes in this patient care, with 32 minutes in direct patient contact, and 14 minutes in chart review and document preparation on the day of the visit.         Aldo Collins M.D.   Professor of Neurology

## 2023-05-17 NOTE — LETTER
2023       RE: Dewey Sultana  : 1970   MRN: [id number]        Dear Colleague,    Thank you for referring your patient, Dewey Sultana, to the [facility] EPILEPSY CARE at [facility]. Please see a copy of my visit note below.      [facility]:  RETURN VISIT          Service Date: 2023      HISTORY:  Mr. 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  He came alone to the visit today.    He reported that he has not had any seizures, loss of consciousness, or other paroxysmal phenomena, following the most recent visit to this clinic on 02/15/2023.      The only grand mal seizure occurred on 2022.  The most recent event with an aura and impaired speech occurred during the last week of 2023.      He denied adverse effects of levetiracetam, which he is taking twice daily as prescribed.       Ictal semiology-history:   The patient confirmed previously reported history today.  He confirmed that he was having stereotyped paroxysmal events with primarily subjective phenomena since , and he had a first and only generalized tonic-clonic seizure in 2022.     The non-convulsive events have been going on for at least several times per year since onset in .  At times, they were significantly more frequent, up to twice per day for brief periods.  One occurred during the last week of 2023.  These always began with a peculiar, but highly stereotyped mental feeling that he has just been startled and is physically unsettled.  He then feels compelled to take repeated deep breaths, although he never feels short of breath.  This lasts for approximately 1 minute, after which he returns to baseline, with no apparent postictal state.  His daughter has seen a number of these events, and she consistently notes that he has slurred speech, although speech content is not  confused, and he often has left periocular twitching repeatedly, during the phase where he is taking repeated deep breaths.  Recently, a coworker told him that during one of these events, the coworker also witnessed left periocular twitching and slurred speech.  The patient himself is not aware of left facial movements or speech alteration.     The patient had a witnessed generalized tonic-clonic seizure on 2022.  His daughter heard noises in his bedroom, and went in to observe him to be stiffly extended with generalized jerking for about a minute.  Afterwards, he was quite confused for many minutes and then mildly confused for another hour.  He was found to have bitten his tongue with blood in his mouth.  Approximately 5 days later, he went to the emergency room for a consultation regarding this episode, at which time he had CT scanning and then a brain MRI, which showed the right hippocampal lesion.     The patient denied that he has ever had a non-convulsive falling with unconsciousness, repetitive involuntary movements or posturing, sudden brief confusion, or other paroxysmal phenomena.  He denied any history of sudden involuntary jerks while fully awake, but he has had hypnic jerks.       The patient does not recognize exacerbating or remitting factors with regard to seizure frequency.      Epilepsy-seizure predispositions:   The patient has no family history of epilepsy or seizures.  He has no history of gestational or  injury, febrile convulsions, developmental delay, stroke, meningitis, encephalitis, significant head injury, or other epileptic predispositions except for a brain MRI lesion.       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  The brain MRI also showed a cortical site of signal alteration in the right paracentral lobule and another area of signal alteration in the left parietal subcortical white matter.     Outpatient 3hr VEEG was normal, at [facility] on 03/22/2023.      Epilepsy therapeutics:   The patient started levetiracetam in February 2023.  He has not used other anti-seizure medications.       PAST MEDICAL-SURGICAL HISTORY:    1.  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    2.  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  3.  History of hyperlipidemia.     FAMILY HISTORY:  There is no family history of seizures or epilepsy, or of other neurological conditions.       PERSONAL AND SOCIAL HISTORY:  The patient grew up in [place].  He graduated from high school in regular classes.  He has worked in multiple positions, currently in construction of precision measurement equipment.  He is .  He lives with his teenage daughter.  He smokes about 1 pack of cigarettes per day.  He does not use ethanol or illicit recreational drugs.     REVIEW OF SYSTEMS:  The patient denied history of weakness, tremors or other abnormal involuntary movements, numbness or tingling, incoordination, falling or difficulty in walking, urinary or fecal incontinence, headache and other pain, except as described above.  The patient denied any history of severe depression or suicidal ideation, severe anxiety, panic attacks, hallucinations, delusions, psychosis, substance abuse, or psychiatric hospitalization.  He denied rashes and easy bruising.  The remainder of a 14-system symptom review was negative.     MEDICATIONS:  Levetiracetam 500 mg b.i.d., atorvastatin, and other medications as per the electronic medical record.     PHYSICAL EXAMINATION:    On examination, the patient was an alert man in no apparent distress.  Vital signs were as per the electronic medical  record.  Skull was normocephalic and atraumatic.  Neck was supple, without signs of meningeal irritation.                                             On neurological examination, the patient appeared alert and was fully oriented to person, place, time, and reason for visit.  Speech showed normal articulation, fluency, repetitions, naming, syntax and comprehension.   Cranial nerves III through XII were normal.  Muscle masses, tones and strengths were normal throughout.  There was no pronator drift.  Sequential fine finger movements were performed normally with each hand.  No spontaneous tremors, myoclonus, or other abnormal movements were observed.  Sensations of light touch were reportedly normal throughout.  The rapid alternating movements, and finger-nose-finger and heel-shin maneuvers were performed normally bilaterally.  Romberg maneuver was negative.  Regular, heel, toe, tandem and reverse tandem walking were normal.  Deep tendon reflexes were normal and symmetric throughout.  Toes were downgoing bilaterally.       IMPRESSION:    The patient did well with initiation of levetiracetam, with no subsequent seizures and no apparent adverse effects.  We will check on initial level today.      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  His neurosurgeon, Dr. Luis Choudhury, is planning to obtain a high-resolution brain MRI study subsequently.     We reviewed the results of the EEG, which was normal.      The patient gives a history of highly stereotyped episodes that almost certainly represent focal seizures of limbic system origin.  Since he does not recall speech impairment and left facial twitching that observers note, it may be suspected that he has some degree of memory impairment during these events.  He has had a single generalized tonic-clonic seizure.       I reviewed [place] regulations on seizures and driving with the patient.   He appeared to clearly understand that he would be prohibited from operating a motor vehicle within 3 months following any future seizure or other episode with sudden unconsciousness or inability to sit up, and that he is required to report any future such seizure to the [facility] within 30 days after the event.       PLAN:    1.  Continue levetiracetam at the current dose.  2.  Check levetiracetam level today.  3.  Return video visit in approximately 6 months.     I spent 46 minutes in this patient care, with 32 minutes in direct patient contact, and 14 minutes in chart review and document preparation on the day of the visit.             Again, thank you for allowing me to participate in the care of your patient.      Sincerely,    Aldo Collins MD

## 2023-05-18 LAB — LEVETIRACETAM SERPL-MCNC: 9.1 ΜG/ML (ref 10–40)

## 2023-06-16 ENCOUNTER — HOSPITAL ENCOUNTER (OUTPATIENT)
Dept: MRI IMAGING | Facility: CLINIC | Age: 53
Discharge: HOME | End: 2023-06-16
Attending: NEUROLOGICAL SURGERY | Admitting: NEUROLOGICAL SURGERY
Payer: COMMERCIAL

## 2023-06-16 DIAGNOSIS — G93.89 BRAIN MASS: ICD-10-CM

## 2023-06-16 PROCEDURE — A9585 GADOBUTROL INJECTION: HCPCS | Performed by: NEUROLOGICAL SURGERY

## 2023-06-16 PROCEDURE — 70553 MRI BRAIN STEM W/O & W/DYE: CPT

## 2023-06-16 PROCEDURE — 255N000002 HC RX 255 OP 636: Performed by: NEUROLOGICAL SURGERY

## 2023-06-16 RX ORDER — GADOBUTROL 604.72 MG/ML
6 INJECTION INTRAVENOUS ONCE
Status: COMPLETED | OUTPATIENT
Start: 2023-06-16 | End: 2023-06-16

## 2023-06-16 RX ADMIN — GADOBUTROL 6 ML: 604.72 INJECTION INTRAVENOUS at 17:16

## 2023-06-20 ENCOUNTER — VIRTUAL VISIT (OUTPATIENT)
Dept: NEUROSURGERY | Facility: CLINIC | Age: 53
End: 2023-06-20
Payer: COMMERCIAL

## 2023-06-20 DIAGNOSIS — G93.89 BRAIN MASS: Primary | ICD-10-CM

## 2023-06-20 DIAGNOSIS — R56.9 SEIZURE (H): ICD-10-CM

## 2023-06-20 PROCEDURE — 99214 OFFICE O/P EST MOD 30 MIN: CPT | Mod: 95 | Performed by: NEUROLOGICAL SURGERY

## 2023-06-20 NOTE — NURSING NOTE
Is the patient currently in the state of [place]? YES    Visit mode:VIDEO    If the visit is dropped, the patient can be reconnected by: VIDEO VISIT: Send to e-mail at: [email or web address]    Will anyone else be joining the visit? NO      How would you like to obtain your AVS? MyChart    Are changes needed to the allergy or medication list? NO    Reason for visit: ISHMAEL Nolan on 6/20/2023 at 4:38 PM

## 2023-06-20 NOTE — PROGRESS NOTES
Virtual Visit Details    Type of service:  Video Visit   Video Start Time: 5:00 PM  Video End Time:5:30 PM    Originating Location (pt. Location): Home    Distant Location (provider location):  On-site  Platform used for Video Visit: Telephone     Neurosurgery Clinic Note    Reason for Visit: hippocampal lesion    History of Present Illness  Dewey Sultana is a 52 year old gentleman with a new diagnosis of epilepsy after finding of Right hippocampal lesion. I referred him to Dr. Collins who has more thoroughly diagnosed a partial epilepsy. He has finally started keppra and has noticed his symptoms have improved significantly with no longer the various strange feelings he used to get. Today he has no new complaints but has questions about his lesion and implications for his daughter.           Allergies   Allergen Reactions     No Known Drug Allergy        Current Outpatient Medications   Medication     levETIRAcetam (KEPPRA) 500 MG tablet     amoxicillin-clavulanate (AUGMENTIN) 875-125 MG tablet     atorvastatin (LIPITOR) 20 MG tablet     clobetasol (TEMOVATE) 0.05 % external cream     tiotropium (SPIRIVA) 18 MCG inhaled capsule     No current facility-administered medications for this visit.       ROS: 10 point ROS neg other than the symptoms noted above in the HPI.        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 It has not changed in 6 months so we will obtain a new surveillance scan in one year. We discussed that this is likely not heritable and that I don't recommend surveillance MRI screening of his daughter at this time. We discussed that if keppra provides successful treatment, then he should continue with medical management.      MRI of the brain with and without contrast in one year      Luis Choudhury,  MD  Department of Neurosurgery  [facility]

## 2023-06-20 NOTE — LETTER
6/20/2023       RE: Dewey Sultana  [address]  [place] [address]     Dear Colleague,    Thank you for referring your patient, Dewey Sultana, to the [facility]UROSURGERY [facility]INIC [facility]. Please see a copy of my visit note below.    Virtual Visit Details    Type of service:  Video Visit   Video Start Time: 5:00 PM  Video End Time:5:30 PM    Originating Location (pt. Location): Home    Distant Location (provider location):  On-site  Platform used for Video Visit: Telephone     Neurosurgery Clinic Note    Reason for Visit: hippocampal lesion    History of Present Illness  Dewey Sultana is a 52 year old gentleman with a new diagnosis of epilepsy after finding of Right hippocampal lesion. I referred him to Dr. Collins who has more thoroughly diagnosed a partial epilepsy. He has finally started keppra and has noticed his symptoms have improved significantly with no longer the various strange feelings he used to get. Today he has no new complaints but has questions about his lesion and implications for his daughter.           Allergies   Allergen Reactions    No Known Drug Allergy        Current Outpatient Medications   Medication    levETIRAcetam (KEPPRA) 500 MG tablet    amoxicillin-clavulanate (AUGMENTIN) 875-125 MG tablet    atorvastatin (LIPITOR) 20 MG tablet    clobetasol (TEMOVATE) 0.05 % external cream    tiotropium (SPIRIVA) 18 MCG inhaled capsule     No current facility-administered medications for this visit.       ROS: 10 point ROS neg other than the symptoms noted above in the HPI.        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 It has not changed in 6 months so we will obtain a new surveillance scan in one year. We  discussed that this is likely not heritable and that I don't recommend surveillance MRI screening of his daughter at this time. We discussed that if keppra provides successful treatment, then he should continue with medical management.      MRI of the brain with and without contrast in one year      Luis Choudhury MD  Department of Neurosurgery  [facility]

## 2023-08-02 ENCOUNTER — VIRTUAL VISIT (OUTPATIENT)
Dept: NEUROLOGY | Facility: CLINIC | Age: 53
End: 2023-08-02
Payer: COMMERCIAL

## 2023-08-02 ENCOUNTER — HOSPITAL ENCOUNTER (EMERGENCY)
Facility: CLINIC | Age: 53
Discharge: HOME | End: 2023-08-02
Attending: FAMILY MEDICINE | Admitting: FAMILY MEDICINE
Payer: COMMERCIAL

## 2023-08-02 ENCOUNTER — TELEPHONE (OUTPATIENT)
Dept: NEUROLOGY | Facility: CLINIC | Age: 53
End: 2023-08-02

## 2023-08-02 VITALS
DIASTOLIC BLOOD PRESSURE: 81 MMHG | RESPIRATION RATE: 20 BRPM | HEART RATE: 98 BPM | TEMPERATURE: 97.9 F | SYSTOLIC BLOOD PRESSURE: 136 MMHG | OXYGEN SATURATION: 98 %

## 2023-08-02 DIAGNOSIS — G40.219 PARTIAL EPILEPSY WITH LOSS OF CONSCIOUSNESS, INTRACTABLE (H): ICD-10-CM

## 2023-08-02 DIAGNOSIS — G40.919 BREAKTHROUGH SEIZURE (H): ICD-10-CM

## 2023-08-02 LAB
ALBUMIN SERPL BCG-MCNC: 4.4 G/DL (ref 3.5–5.2)
ALP SERPL-CCNC: 84 U/L (ref 40–129)
ALT SERPL W P-5'-P-CCNC: 12 U/L (ref 0–70)
ANION GAP SERPL CALCULATED.3IONS-SCNC: 14 MMOL/L (ref 7–15)
AST SERPL W P-5'-P-CCNC: 16 U/L (ref 0–45)
BASOPHILS # BLD AUTO: 0.1 10E3/UL (ref 0–0.2)
BASOPHILS NFR BLD AUTO: 1 %
BILIRUB SERPL-MCNC: 0.3 MG/DL
BUN SERPL-MCNC: 8 MG/DL (ref 6–20)
CALCIUM SERPL-MCNC: 9.1 MG/DL (ref 8.6–10)
CHLORIDE SERPL-SCNC: 96 MMOL/L (ref 98–107)
CREAT SERPL-MCNC: 0.88 MG/DL (ref 0.67–1.17)
DEPRECATED HCO3 PLAS-SCNC: 21 MMOL/L (ref 22–29)
EOSINOPHIL # BLD AUTO: 0.1 10E3/UL (ref 0–0.7)
EOSINOPHIL NFR BLD AUTO: 1 %
ERYTHROCYTE [DISTWIDTH] IN BLOOD BY AUTOMATED COUNT: 13 % (ref 10–15)
GFR SERPL CREATININE-BSD FRML MDRD: >90 ML/MIN/1.73M2
GLUCOSE SERPL-MCNC: 149 MG/DL (ref 70–99)
HCT VFR BLD AUTO: 45.5 % (ref 40–53)
HGB BLD-MCNC: 15.7 G/DL (ref 13.3–17.7)
IMM GRANULOCYTES # BLD: 0 10E3/UL
IMM GRANULOCYTES NFR BLD: 0 %
LEVETIRACETAM SERPL-MCNC: 4.2 ΜG/ML (ref 10–40)
LYMPHOCYTES # BLD AUTO: 3.6 10E3/UL (ref 0.8–5.3)
LYMPHOCYTES NFR BLD AUTO: 35 %
MCH RBC QN AUTO: 31.7 PG (ref 26.5–33)
MCHC RBC AUTO-ENTMCNC: 34.5 G/DL (ref 31.5–36.5)
MCV RBC AUTO: 92 FL (ref 78–100)
MONOCYTES # BLD AUTO: 1.1 10E3/UL (ref 0–1.3)
MONOCYTES NFR BLD AUTO: 10 %
NEUTROPHILS # BLD AUTO: 5.5 10E3/UL (ref 1.6–8.3)
NEUTROPHILS NFR BLD AUTO: 53 %
NRBC # BLD AUTO: 0 10E3/UL
NRBC BLD AUTO-RTO: 0 /100
PLATELET # BLD AUTO: 231 10E3/UL (ref 150–450)
POTASSIUM SERPL-SCNC: 3.4 MMOL/L (ref 3.4–5.3)
PROT SERPL-MCNC: 6.7 G/DL (ref 6.4–8.3)
RBC # BLD AUTO: 4.96 10E6/UL (ref 4.4–5.9)
SODIUM SERPL-SCNC: 131 MMOL/L (ref 136–145)
WBC # BLD AUTO: 10.4 10E3/UL (ref 4–11)

## 2023-08-02 PROCEDURE — 250N000011 HC RX IP 250 OP 636: Mod: JZ | Performed by: FAMILY MEDICINE

## 2023-08-02 PROCEDURE — 96374 THER/PROPH/DIAG INJ IV PUSH: CPT | Performed by: FAMILY MEDICINE

## 2023-08-02 PROCEDURE — 99284 EMERGENCY DEPT VISIT MOD MDM: CPT | Performed by: FAMILY MEDICINE

## 2023-08-02 PROCEDURE — 99284 EMERGENCY DEPT VISIT MOD MDM: CPT | Mod: 25 | Performed by: FAMILY MEDICINE

## 2023-08-02 PROCEDURE — 36415 COLL VENOUS BLD VENIPUNCTURE: CPT | Performed by: FAMILY MEDICINE

## 2023-08-02 PROCEDURE — 85025 COMPLETE CBC W/AUTO DIFF WBC: CPT | Performed by: FAMILY MEDICINE

## 2023-08-02 PROCEDURE — 80177 DRUG SCRN QUAN LEVETIRACETAM: CPT | Performed by: FAMILY MEDICINE

## 2023-08-02 PROCEDURE — 80053 COMPREHEN METABOLIC PANEL: CPT | Performed by: FAMILY MEDICINE

## 2023-08-02 RX ORDER — LEVETIRACETAM 500 MG/1
TABLET ORAL
Qty: 90 TABLET | Refills: 5 | Status: SHIPPED | OUTPATIENT
Start: 2023-08-02 | End: 2023-12-06

## 2023-08-02 RX ORDER — LEVETIRACETAM 500 MG/5ML
500 INJECTION, SOLUTION, CONCENTRATE INTRAVENOUS ONCE
Status: COMPLETED | OUTPATIENT
Start: 2023-08-02 | End: 2023-08-02

## 2023-08-02 RX ADMIN — LEVETIRACETAM 500 MG: 100 INJECTION, SOLUTION INTRAVENOUS at 02:14

## 2023-08-02 ASSESSMENT — ACTIVITIES OF DAILY LIVING (ADL): ADLS_ACUITY_SCORE: 35

## 2023-08-02 NOTE — ED TRIAGE NOTES
Triage Assessment       Row Name 08/02/23 0051       Triage Assessment (Adult)    Airway WDL WDL       Respiratory WDL    Respiratory WDL WDL                  Approx 3 minute seizure at home.  Witnessed by daughter.  Patient fell striking face

## 2023-08-02 NOTE — LETTER
8/2/2023       RE: Dewey Sultana  [address]  [place] [address]      To Whom It May Concern:        Mr. Sultana is able to return to work 8/3/2023 with no new restrictions.         Sincerely,        Angela Gutierrez PA-C

## 2023-08-02 NOTE — PROGRESS NOTES
"[facility] Epilepsy Care Video Visit Note      Patient:  Dewey Sultana  :  1970   Age:  52 year old   Today's Office Visit:  2023    Epilepsy Data:  Seizures with primarily subjective phenomena started , and he had a first generalized tonic-clonic seizure in 2022.       Brain MRI 2023: No significant change since 2022.  1.  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  2.  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     EEG 3/2023: normal    History of Present Illness:   He reports yesterday he was sitting at his computer and he stood up. His daughter heard him fall and found him on floor shaking. She called 911 and he was brought to ER. He reports he was slightly confused after seizure but didn't have any of the body aches like he did with his previous GTC seizure. No tongue bite or incontinence. Seizure lasted 1-2 minutes. ED notes mention he had a \"roller coaster feeling\" prior to seizure, but he denies this.   Levetiracetam level in ED was 4.2, previous level in 2023 was 9.1. Because he didn't feel as bad after he wonders if he just tripped and fell, however he does not recall the fall or hitting the ground. He does report he drinks a significant amount of caffeine, approximately ten 20oz diet mountain dew's a day. He reports taking his levetiracetam 500-500 regularly. He had not yet taken it that evening and was a few hours later than normal.       Current Outpatient Medications   Medication Sig Dispense Refill    levETIRAcetam (KEPPRA) 500 MG tablet Take 1.5 tabs (750mg) BID 90 tablet 5    amoxicillin-clavulanate (AUGMENTIN) 875-125 MG tablet Take 1 tablet by mouth 2 times daily (Patient not taking: Reported on 2022)      atorvastatin (LIPITOR) 20 MG tablet Take 1 tablet (20 mg) by mouth daily (Patient not taking: Reported on 2/15/2023) 90 tablet 0    clobetasol (TEMOVATE) 0.05 % external " cream Apply topically 2 times daily For up to 2 weeks at a time (Patient not taking: Reported on 9/9/2022) 30 g 0    tiotropium (SPIRIVA) 18 MCG inhaled capsule Inhale 1 capsule (18 mcg) into the lungs daily (Patient not taking: Reported on 12/20/2022) 30 capsule 5        Medication Notes:  levetiracetam 500-500, denies side effects      AED Medication Compliance:  compliant most of the time      Review of Systems:  No side effects from levetiracetam     Have you experienced a traumatic fall since your last visit: YES  Are these falls related to your seizures: YES: sustained abrasion above right eye    Other Issues:  He would like to return to work and needs a letter stating he can do so. He does not drive for work, climb ladders/be at heights or use power tools. He states is sitting or standing at work bench using hand tools.     Is patient safe to drive:  No    Exam:  Full neuro exam unable to be completed. Alert, Mood appropriate and not overtly depressed. Speech fluent. Face symmetric, smile symmetric.Has abrasion above right eye on forehead     There were no vitals taken for this visit.     Wt Readings from Last 5 Encounters:   05/17/23 132 lb 6.4 oz (60.1 kg)   02/15/23 135 lb (61.2 kg)   01/25/23 135 lb (61.2 kg)   12/06/22 130 lb (59 kg)   09/09/22 129 lb 8 oz (58.7 kg)       Assessment and Plan:   Focal epilepsy, lesional. Had breakthrough seizure (only second GTC) yesterday. No missed levetiracetam doses but was late in taking it. He has tolerated levetiracetam without problems and is open to increasing it today.      1.Increase levetiracetam to 750 in the morning and 750 in the evening   2. Call if any further seizures as levetiracetam may need to be further increased  3. Discussed no driving per [place] state law for 90 days  4. Letter completed ok to return to work  5. Follow-up in 3-6 months with Dr. Dennis Wesley-George, PAJOSSELYN    Total time on video today 23 min. Additional 10 min reviewing  chart prior to visit. Additional  15 min coordinating care and generating note following visit. Total time 48 min on day of visit.

## 2023-08-02 NOTE — LETTER
"2023       RE: Dewey Sultana  : 1970   MRN: [id number]      Dear Colleague,    Thank you for referring your patient, Dewey Sultana, to the [facility] EPILEPSY CARE at [facility]. Please see a copy of my visit note below.    Dewey is a 52 year old who is being evaluated via a billable video visit.      How would you like to obtain your AVS? MyChart  If the video visit is dropped, the invitation should be resent by: Text to cell phone: [phone]  Will anyone else be joining your video visit? No        [facility] Epilepsy Care Video Visit Note      Patient:  Dewey Sultana  :  1970   Age:  52 year old   Today's Office Visit:  2023    Epilepsy Data:  Seizures with primarily subjective phenomena started , and he had a first generalized tonic-clonic seizure in 2022.       Brain MRI 2023: No significant change since 2022.  1.  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  2.  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     EEG 3/2023: normal    History of Present Illness:   He reports yesterday he was sitting at his computer and he stood up. His daughter heard him fall and found him on floor shaking. She called 911 and he was brought to ER. He reports he was slightly confused after seizure but didn't have any of the body aches like he did with his previous GTC seizure. No tongue bite or incontinence. Seizure lasted 1-2 minutes. ED notes mention he had a \"roller coaster feeling\" prior to seizure, but he denies this.   Levetiracetam level in ED was 4.2, previous level in 2023 was 9.1. Because he didn't feel as bad after he wonders if he just tripped and fell, however he does not recall the fall or hitting the ground. He does report he drinks a significant amount of caffeine, approximately ten 20oz diet mountain dew's a day. He reports " taking his levetiracetam 500-500 regularly. He had not yet taken it that evening and was a few hours later than normal.       Current Outpatient Medications   Medication Sig Dispense Refill    levETIRAcetam (KEPPRA) 500 MG tablet Take 1.5 tabs (750mg) BID 90 tablet 5    amoxicillin-clavulanate (AUGMENTIN) 875-125 MG tablet Take 1 tablet by mouth 2 times daily (Patient not taking: Reported on 12/20/2022)      atorvastatin (LIPITOR) 20 MG tablet Take 1 tablet (20 mg) by mouth daily (Patient not taking: Reported on 2/15/2023) 90 tablet 0    clobetasol (TEMOVATE) 0.05 % external cream Apply topically 2 times daily For up to 2 weeks at a time (Patient not taking: Reported on 9/9/2022) 30 g 0    tiotropium (SPIRIVA) 18 MCG inhaled capsule Inhale 1 capsule (18 mcg) into the lungs daily (Patient not taking: Reported on 12/20/2022) 30 capsule 5        Medication Notes:  levetiracetam 500-500, denies side effects      AED Medication Compliance:  compliant most of the time      Review of Systems:  No side effects from levetiracetam     Have you experienced a traumatic fall since your last visit: YES  Are these falls related to your seizures: YES: sustained abrasion above right eye    Other Issues:  He would like to return to work and needs a letter stating he can do so. He does not drive for work, climb ladders/be at heights or use power tools. He states is sitting or standing at work bench using hand tools.     Is patient safe to drive:  No    Exam:  Full neuro exam unable to be completed. Alert, Mood appropriate and not overtly depressed. Speech fluent. Face symmetric, smile symmetric.Has abrasion above right eye on forehead     There were no vitals taken for this visit.     Wt Readings from Last 5 Encounters:   05/17/23 132 lb 6.4 oz (60.1 kg)   02/15/23 135 lb (61.2 kg)   01/25/23 135 lb (61.2 kg)   12/06/22 130 lb (59 kg)   09/09/22 129 lb 8 oz (58.7 kg)       Assessment and Plan:   Focal epilepsy, lesional. Had  breakthrough seizure (only second GTC) yesterday. No missed levetiracetam doses but was late in taking it. He has tolerated levetiracetam without problems and is open to increasing it today.      1.Increase levetiracetam to 750 in the morning and 750 in the evening   2. Call if any further seizures as levetiracetam may need to be further increased  3. Discussed no driving per [place] state law for 90 days  4. Letter completed ok to return to work  5. Follow-up in 3-6 months with Dr. Collins      Total time on video today 23 min. Additional 10 min reviewing chart prior to visit. Additional  15 min coordinating care and generating note following visit. Total time 48 min on day of visit.            Again, thank you for allowing me to participate in the care of your patient.      Sincerely,    ROLY Ruiz

## 2023-08-02 NOTE — PROGRESS NOTES
Dewey is a 52 year old who is being evaluated via a billable video visit.      How would you like to obtain your AVS? [employer]  If the video visit is dropped, the invitation should be resent by: Text to cell phone: [phone]  Will anyone else be joining your video visit? No        Video-Visit Details    Type of service:  Video Visit   Video Start Time:  12:57  Video End Time: 1:20    Originating Location (pt. Location): Home    Distant Location (provider location):  On-site  Platform used for Video Visit: ChipWell

## 2023-08-02 NOTE — DISCHARGE INSTRUCTIONS
Please take your Keppra 500 mg twice a day at the same time each day.  Your Keppra level is pending.  You received a 500 mg IV dose of Keppra in the ED.  Follow-up with your neurologist tomorrow.  Please do not drive until you are cleared by your neurologist.  Return to the emergency department if you have any recurrent seizures.

## 2023-08-02 NOTE — ED PROVIDER NOTES
"                                                            [facility] ED Provider Note   Patient: Dewey Sultana  MRN #:  [id number]  Date of Visit: August 2, 2023    CC:     Chief Complaint   Patient presents with    Seizures     HPI:  Dewey Sultana is a 52 year old male who presented to the emergency department private vehicle with acute recurrent tonic-clonic seizure.  Patient was working at his desk when he collapsed and fell to the ground.  He has a \"roller coaster feeling\" prior to the onset of his seizure.  He has a sensation of free falling.  Patient's daughter was in the next room, and found the patient on the floor seizing.  Seizure lasted for a few minutes.  Patient did not bite his tongue, or have any bowel or bladder incontinence.  He has a rug burn to the forehead and bridge of the nose.  He had glasses on at the time.  Patient denies any significant or lasting postictal period.  His facial injuries are described as a slight burn.  Patient is on Keppra, and does not know whether he took his dose this evening.  He typically takes it about 12 hours apart.  Sometimes the medications are delayed by a few hours.  His recent neurology visit dated May 17 are as follows.  In June, patient had a high-resolution MRI scan that showed no change to his stable expansile nonenhancing multicystic right hippocampal lesion.  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  IMPRESSION:    The patient did well with initiation of levetiracetam, with no subsequent seizures and no apparent adverse effects.  We will check on initial level today.       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  His neurosurgeon, Dr. Luis Choudhury, is planning to obtain a high-resolution brain MRI study subsequently.   We reviewed the results of the EEG, which was normal.     The patient gives a history " of highly stereotyped episodes that almost certainly represent focal seizures of limbic system origin.  Since he does not recall speech impairment and left facial twitching that observers note, it may be suspected that he has some degree of memory impairment during these events.  He has had a single generalized tonic-clonic seizure.     I reviewed [place] regulations on seizures and driving with the patient.  He appeared to clearly understand that he would be prohibited from operating a motor vehicle within 3 months following any future seizure or other episode with sudden unconsciousness or inability to sit up, and that he is required to report any future such seizure to the [facility] within 30 days after the event.     PLAN:    1.  Continue levetiracetam at the current dose.  2.  Check levetiracetam level today.  3.  Return video visit in approximately 6 months.     I spent 46 minutes in this patient care, with 32 minutes in direct patient contact, and 14 minutes in chart review and document preparation on the day of the visit.      Aldo Collins M.D.   Professor of Neurology       Problem List:  Patient Active Problem List    Diagnosis Date Noted    Partial epilepsy with loss of consciousness, intractable (H) 02/16/2023     Priority: Medium    Excessive caffeine intake 12/06/2022     Priority: Medium    CARDIOVASCULAR SCREENING; LDL GOAL LESS THAN 130 04/08/2013     Priority: Medium    Depressive disorder, not elsewhere classified 12/13/2007     Priority: Medium    Tobacco use disorder 06/01/2007     Priority: Medium    Esophageal reflux 06/01/2007     Priority: Medium       Past Medical History:   Diagnosis Date    Depressive disorder, not elsewhere classified     Excessive caffeine intake 12/6/2022    Unspecified hemorrhoids without mention of complication        MEDS: amoxicillin-clavulanate (AUGMENTIN) 875-125 MG tablet  atorvastatin (LIPITOR) 20 MG tablet  clobetasol (TEMOVATE) 0.05 % external  cream  levETIRAcetam (KEPPRA) 500 MG tablet  tiotropium (SPIRIVA) 18 MCG inhaled capsule        ALLERGIES:    Allergies   Allergen Reactions    No Known Drug Allergy        Past Surgical History:   Procedure Laterality Date    COLONOSCOPY  07/07/2006    HC ANOSCOPY W BIOPSY SINGLE/MULTIPLE  06/09/06    Large internal hemorrhoids with a component of rectal prolapse    HC INCISION TENDON SHEATH FINGER      Trigger finger release, right.       Social History     Tobacco Use    Smoking status: Every Day     Packs/day: 1.00     Years: 20.00     Pack years: 20.00     Types: Cigarettes     Passive exposure: Current    Smokeless tobacco: Never   Vaping Use    Vaping Use: Never used   Substance Use Topics    Alcohol use: Yes    Drug use: No         Review of Systems   Except as noted in HPI, all other systems were reviewed and are negative    Physical Exam   Vitals were reviewed  Patient Vitals for the past 8 hrs:   BP Temp Temp src Pulse Resp SpO2   08/02/23 0050 136/81 97.9  F (36.6  C) Oral 98 20 98 %     GENERAL APPEARANCE: Alert and oriented x3.  GCS 15  HEAD: Abrasion to the right forehead with minor swelling  FACE: As above; also abrasion to the bridge of the nose  EYES: Pupils are equal  HENT: normal external exam  NECK: no adenopathy or asymmetry  RESP: normal respiratory effort; clear breath sounds bilaterally  CV: regular rate and rhythm; no significant murmurs, gallops or rubs  ABD: soft, no tenderness; no rebound or guarding; bowel sounds are normal  EXT: No calf tenderness or pitting edema  SKIN: no worrisome rash  NEURO: no facial droop; no focal deficits, speech is normal        Available Lab/Imaging Results     Results for orders placed or performed during the hospital encounter of 08/02/23 (from the past 24 hour(s))   CBC with platelets differential    Narrative    The following orders were created for panel order CBC with platelets differential.  Procedure                               Abnormality          Status                     ---------                               -----------         ------                     CBC with platelets and d...[[id number]]                      Final result                 Please view results for these tests on the individual orders.   Comprehensive metabolic panel   Result Value Ref Range    Sodium 131 (L) 136 - 145 mmol/L    Potassium 3.4 3.4 - 5.3 mmol/L    Chloride 96 (L) 98 - 107 mmol/L    Carbon Dioxide (CO2) 21 (L) 22 - 29 mmol/L    Anion Gap 14 7 - 15 mmol/L    Urea Nitrogen 8.0 6.0 - 20.0 mg/dL    Creatinine 0.88 0.67 - 1.17 mg/dL    Calcium 9.1 8.6 - 10.0 mg/dL    Glucose 149 (H) 70 - 99 mg/dL    Alkaline Phosphatase 84 40 - 129 U/L    AST 16 0 - 45 U/L    ALT 12 0 - 70 U/L    Protein Total 6.7 6.4 - 8.3 g/dL    Albumin 4.4 3.5 - 5.2 g/dL    Bilirubin Total 0.3 <=1.2 mg/dL    GFR Estimate >90 >60 mL/min/1.73m2   CBC with platelets and differential   Result Value Ref Range    WBC Count 10.4 4.0 - 11.0 10e3/uL    RBC Count 4.96 4.40 - 5.90 10e6/uL    Hemoglobin 15.7 13.3 - 17.7 g/dL    Hematocrit 45.5 40.0 - 53.0 %    MCV 92 78 - 100 fL    MCH 31.7 26.5 - 33.0 pg    MCHC 34.5 31.5 - 36.5 g/dL    RDW 13.0 10.0 - 15.0 %    Platelet Count 231 150 - 450 10e3/uL    % Neutrophils 53 %    % Lymphocytes 35 %    % Monocytes 10 %    % Eosinophils 1 %    % Basophils 1 %    % Immature Granulocytes 0 %    NRBCs per 100 WBC 0 <1 /100    Absolute Neutrophils 5.5 1.6 - 8.3 10e3/uL    Absolute Lymphocytes 3.6 0.8 - 5.3 10e3/uL    Absolute Monocytes 1.1 0.0 - 1.3 10e3/uL    Absolute Eosinophils 0.1 0.0 - 0.7 10e3/uL    Absolute Basophils 0.1 0.0 - 0.2 10e3/uL    Absolute Immature Granulocytes 0.0 <=0.4 10e3/uL    Absolute NRBCs 0.0 10e3/uL              Impression     Final diagnoses:   Breakthrough seizure (H)         ED Course & Medical Decision Making   Dewey Sultana is a 52 year old male who presented to the emergency department with breakthrough seizure that occurred tonight shortly after  midnight.  This was a witnessed seizure by the patient's daughter.  Patient was working in his office when he had his brief prodromal symptom of feeling like he was free falling from a roller coaster.  Patient's seizure lasted a couple minutes, and he has a rug burn to the forehead and bridge of the nose.  He had his glasses on at the time.  Patient feels back to normal.  He did not have any bowel or bladder incontinence and did not bite his tongue this time.  He thinks that he may have missed his Keppra dose last night.  He typically takes his dose at 6:00 in the morning and then again in the evening.    Vital signs were normal with temperature of 97.9, blood pressure 130/81, heart rate of 98, respiratory of 20 with 98% oxygen saturation.  Patient is a normal neurologic exam.  Patient has abrasions to the forehead and bridge of the nose.  No serious injuries were identified.  Laboratory work-up reveals a normal CBC.  Comprehensive metabolic panel reveals a sodium of 131, potassium of 3.4, chloride of 96, calcium level of 9.1, glucose of 149, normal liver enzymes.    Keppra level is pending.  Patient received an IV dose of Keppra 500 mg.  Continue Keppra and make sure that he does not miss a dose.  I explained to them that he will not be able to drive until he is cleared by the neurologist.  It is important to take his medications at the same time each day.  Medication dose may need to be adjusted based on his Keppra level.  Patient expressed understanding and agreement with discharge instructions below.        Written after-visit summary and instructions were given at the time of discharge.        Discharge Instructions:   Please take your Keppra 500 mg twice a day at the same time each day.  Your Keppra level is pending.  You received a 500 mg IV dose of Keppra in the ED.  Follow-up with your neurologist tomorrow.  Please do not drive until you are cleared by your neurologist.  Return to the emergency department  if you have any recurrent seizures.       Disclaimer: This note consists of words and symbols derived from keyboarding and dictation using voice recognition software.  As a result, there may be errors that have gone undetected.  Please consider this when interpreting information found in this note.       Daniel Corona MD  08/02/23 044

## 2023-08-02 NOTE — PATIENT INSTRUCTIONS
-Increase levetiracetam to 750 in the morning and 750 in the evening    -Call if any further seizures as levetiracetam may need to be further increased    -No driving per [place] state law for 90 days

## 2023-08-02 NOTE — TELEPHONE ENCOUNTER
[facility] Call Center    Phone Message    May a detailed message be left on voicemail: yes     Reason for Call: Other: Pt is calling because he was seen in the [place] ER yesterday and was instructed to reach out to his neurologist to see if his medication that he is taking is a normal dosage. Pt states that this morning does not feel like he did when he had the seizure yesterday. Pt doesn't know if he tripped and knocked himself out or if he had a seizure. Please call Pt to discuss       Action Taken: Other: [facility]    Travel Screening: Not Applicable

## 2023-12-06 ENCOUNTER — VIRTUAL VISIT (OUTPATIENT)
Dept: NEUROLOGY | Facility: CLINIC | Age: 53
End: 2023-12-06
Payer: COMMERCIAL

## 2023-12-06 VITALS — WEIGHT: 140 LBS | BODY MASS INDEX: 23.32 KG/M2 | HEIGHT: 65 IN

## 2023-12-06 DIAGNOSIS — G40.219 PARTIAL EPILEPSY WITH LOSS OF CONSCIOUSNESS, INTRACTABLE (H): ICD-10-CM

## 2023-12-06 RX ORDER — LEVETIRACETAM 500 MG/1
1000 TABLET ORAL 2 TIMES DAILY
Qty: 360 TABLET | Refills: 3 | Status: SHIPPED | OUTPATIENT
Start: 2023-12-06 | End: 2023-12-15

## 2023-12-06 ASSESSMENT — PATIENT HEALTH QUESTIONNAIRE - PHQ9: SUM OF ALL RESPONSES TO PHQ QUESTIONS 1-9: 1

## 2023-12-06 ASSESSMENT — PAIN SCALES - GENERAL: PAINLEVEL: NO PAIN (0)

## 2023-12-06 NOTE — LETTER
2023       RE: Dewey Sultana  : 1970   MRN: [id number]        Dear Colleague,    Thank you for referring your patient, Dewey Sultana, to the [facility] EPILEPSY CARE at [facility]. Please see a copy of my visit note below.    EPILEPSY CLINIC VIDEO VISIT NOTE         Service Date: 2023    HISTORY: I spoke with Mr. Dewey Sultana, who is a 53-year-old, right-handed man with lesional focal epilepsy.  He was alone for the visit today.    Following the most recent visit to this clinic on 2023, the patient reportedly has had a single seizure, which was a witnessed grand mal seizure at home on 2023.  Paramedics took him to the [facility] where his levetiracetam level was found to be 4.2 mcg/ml.  He denied missing doses before the seizure.    The last prior grand mal seizure occurred on 2022.  The most recent event with an aura and impaired speech occurred during the last week of 2023.  Levetiracetam was started in 2023.  He has not been on other AEDs.      He denied adverse effects of levetiracetam, which he is taking twice daily as prescribed.       Ictal semiology-history:   The patient confirmed previously reported history today.  He confirmed that he was having stereotyped paroxysmal events with primarily subjective phenomena since , and he had a first and only generalized tonic-clonic seizure in 2022.     The non-convulsive events have been going on for at least several times per year since onset in .  At times, they were significantly more frequent, up to twice per day for brief periods.  One occurred during the last week of 2023.  These always began with a peculiar, but highly stereotyped mental feeling that he has just been startled and is physically unsettled.  He then feels compelled to take repeated deep breaths, although he never feels short of breath.  This lasts for  approximately 1 minute, after which he returns to baseline, with no apparent postictal state.  His daughter has seen a number of these events, and she consistently notes that he has slurred speech, although speech content is not confused, and he often has left periocular twitching repeatedly, during the phase where he is taking repeated deep breaths.  Recently, a coworker told him that during one of these events, the coworker also witnessed left periocular twitching and slurred speech.  The patient himself is not aware of left facial movements or speech alteration.     The patient had a witnessed generalized tonic-clonic seizure on 2022.  His daughter heard noises in his bedroom, and went in to observe him to be stiffly extended with generalized jerking for about a minute.  Afterwards, he was quite confused for many minutes and then mildly confused for another hour.  He was found to have bitten his tongue with blood in his mouth.  Approximately 5 days later, he went to the emergency room for a consultation regarding this episode, at which time he had CT scanning and then a brain MRI, which showed the right hippocampal lesion.     The patient denied that he has ever had a non-convulsive falling with unconsciousness, repetitive involuntary movements or posturing, sudden brief confusion, or other paroxysmal phenomena.  He denied any history of sudden involuntary jerks while fully awake, but he has had hypnic jerks.       The patient does not recognize exacerbating or remitting factors with regard to seizure frequency.      Epilepsy-seizure predispositions:   The patient has no family history of epilepsy or seizures.  He has no history of gestational or  injury, febrile convulsions, developmental delay, stroke, meningitis, encephalitis, significant head injury, or other epileptic predispositions except for a brain MRI lesion.       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  The brain MRI also showed a cortical site of signal alteration in the right paracentral lobule and another area of signal alteration in the left parietal subcortical white matter.     Outpatient 3hr VEEG was normal, at [facility] on 03/22/2023.      Epilepsy therapeutics:   The patient started levetiracetam in February 2023.  He has not used other anti-seizure medications.       PAST MEDICAL-SURGICAL HISTORY:    1.  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    2.  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  3.  History of hyperlipidemia.    MEDICATIONS:  Levetiracetam 500 mg b.i.d., and other medications as per the electronic medical record.    VIDEO OBSERVATIONS:  The patient spoke with normal articulation, fluency and syntax, with normal comprehension of questions.  On command, the patient moved the direction of gaze into all 4 quadrants conjugately and without  nystagmus.  Facial movements were normal.  Tongue protruded on the midline.  The patient did not display any resting tremors or action tremors of the outstretched arms. Limb  movements were normal.    IMPRESSION:  The patient had a witnessed generalized tonic-clonic seizure while on the current dose of levetiracetam, which has not been associated with adverse effects.  Paramedics took him to the [facility] where his levetiracetam level was found to be 4.2 mcg/ml.  He denied missing doses before the seizure.  Today we agreed to increase the levetiracetam dose, later with a follow-up blood level.    He now has had 2 generalized tonic-clonic seizures, in the setting of a nonenhancing multicystic right hippocampal lesion, reported  to be stable on brain MRI.    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  His neurosurgeon, Dr. Luis Choudhury, is planning to obtain a high-resolution brain MRI study subsequently.     We reviewed the results of the EEG, which was normal.       The patient gives a history of highly stereotyped episodes that almost certainly represent focal seizures of limbic system origin.  Since he does not recall speech impairment and left facial twitching that observers note, it may be suspected that he has some degree of memory impairment during these events.  He has had a single generalized tonic-clonic seizure.       The patient noted that he stopped driving when the seizure occurred in August 2023.  I reviewed [place] regulations on seizures and driving with the patient.  He appeared to clearly understand that he would be prohibited from operating a motor vehicle within 3 months following any future seizure or other episode with sudden unconsciousness or inability to sit up, and that he is required to report any future such seizure to the [facility] within 30 days after the event.      PLAN:    1.  Increase levetiracetam to 1000 mg twice daily.  2.  Check levetiracetam level in approximately 1 week.  3.  Return video visit in approximately 4 months.       I spent 31 minutes in this patient care, with 13 minutes in direct patient contact, and 18 minutes in chart review and document preparation on the day of the visit.          Again, thank you for allowing me to participate in the care of your patient.      Sincerely,    Aldo Collins MD

## 2023-12-06 NOTE — PROGRESS NOTES
Virtual Visit Details    Type of service:  Video Visit       EPILEPSY CLINIC VIDEO VISIT NOTE         Service Date: 12/06/2023    HISTORY: I spoke with Mr. Dewey Sultana, who is a 53-year-old, right-handed man with lesional focal epilepsy.  He was alone for the visit today.    Following the most recent visit to this clinic on 05/17/2023, the patient reportedly has had a single seizure, which was a witnessed grand mal seizure at home on 08/02/2023.  Paramedics took him to the [facility], where his levetiracetam level was found to be 4.2 mcg/ml.  He denied missing doses before the seizure.    The last prior grand mal seizure occurred on 12/01/2022.  The most recent event with an aura and impaired speech occurred during the last week of January 2023.  Levetiracetam was started in February 2023.  He has not been on other AEDs.      He denied adverse effects of levetiracetam, which he is taking twice daily as prescribed.       Ictal semiology-history:   The patient confirmed previously reported history today.  He confirmed that he was having stereotyped paroxysmal events with primarily subjective phenomena since 2006, and he had a first and only generalized tonic-clonic seizure in December 2022.     The non-convulsive events have been going on for at least several times per year since onset in 2006.  At times, they were significantly more frequent, up to twice per day for brief periods.  One occurred during the last week of 01/2023.  These always began with a peculiar, but highly stereotyped mental feeling that he has just been startled and is physically unsettled.  He then feels compelled to take repeated deep breaths, although he never feels short of breath.  This lasts for approximately 1 minute, after which he returns to baseline, with no apparent postictal state.  His daughter has seen a number of these events, and she consistently notes that he has slurred speech, although speech content is not  confused, and he often has left periocular twitching repeatedly, during the phase where he is taking repeated deep breaths.  Recently, a coworker told him that during one of these events, the coworker also witnessed left periocular twitching and slurred speech.  The patient himself is not aware of left facial movements or speech alteration.     The patient had a witnessed generalized tonic-clonic seizure on 2022.  His daughter heard noises in his bedroom, and went in to observe him to be stiffly extended with generalized jerking for about a minute.  Afterwards, he was quite confused for many minutes and then mildly confused for another hour.  He was found to have bitten his tongue with blood in his mouth.  Approximately 5 days later, he went to the emergency room for a consultation regarding this episode, at which time he had CT scanning and then a brain MRI, which showed the right hippocampal lesion.     The patient denied that he has ever had a non-convulsive falling with unconsciousness, repetitive involuntary movements or posturing, sudden brief confusion, or other paroxysmal phenomena.  He denied any history of sudden involuntary jerks while fully awake, but he has had hypnic jerks.       The patient does not recognize exacerbating or remitting factors with regard to seizure frequency.      Epilepsy-seizure predispositions:   The patient has no family history of epilepsy or seizures.  He has no history of gestational or  injury, febrile convulsions, developmental delay, stroke, meningitis, encephalitis, significant head injury, or other epileptic predispositions except for a brain MRI lesion.       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  The brain MRI also showed a cortical site of signal alteration in the right paracentral lobule and another area of signal alteration in the left parietal subcortical white matter.     Outpatient 3hr VEEG was normal, at [facility] on 03/22/2023.      Epilepsy therapeutics:   The patient started levetiracetam in February 2023.  He has not used other anti-seizure medications.       PAST MEDICAL-SURGICAL HISTORY:    1.  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    2.  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  3.  History of hyperlipidemia.    MEDICATIONS:  Levetiracetam 500 mg b.i.d., and other medications as per the electronic medical record.    VIDEO OBSERVATIONS:  The patient spoke with normal articulation, fluency and syntax, with normal comprehension of questions.  On command, the patient moved the direction of gaze into all 4 quadrants conjugately and without  nystagmus.  Facial movements were normal.  Tongue protruded on the midline.  The patient did not display any resting tremors or action tremors of the outstretched arms. Limb  movements were normal.    IMPRESSION:  The patient had a witnessed generalized tonic-clonic seizure while on the current dose of levetiracetam, which has not been associated with adverse effects.  Paramedics took him to the [facility] where his levetiracetam level was found to be 4.2 mcg/ml.  He denied missing doses before the seizure.  Today we agreed to increase the levetiracetam dose, later with a follow-up blood level.    He now has had 2 generalized tonic-clonic seizures, in the setting of a nonenhancing multicystic right hippocampal lesion, reported to be stable on brain MRI.    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  His neurosurgeon, Dr. Luis Choudhury, is planning to obtain a  high-resolution brain MRI study subsequently.     We reviewed the results of the EEG, which was normal.       The patient gives a history of highly stereotyped episodes that almost certainly represent focal seizures of limbic system origin.  Since he does not recall speech impairment and left facial twitching that observers note, it may be suspected that he has some degree of memory impairment during these events.  He has had a single generalized tonic-clonic seizure.       The patient noted that he stopped driving when the seizure occurred in August 2023.  I reviewed [place] regulations on seizures and driving with the patient.  He appeared to clearly understand that he would be prohibited from operating a motor vehicle within 3 months following any future seizure or other episode with sudden unconsciousness or inability to sit up, and that he is required to report any future such seizure to the [facility] within 30 days after the event.      PLAN:    1.  Increase levetiracetam to 1000 mg twice daily.  2.  Check levetiracetam level in approximately 1 week.  3.  Return video visit in approximately 4 months.     Video Conference via [employer].   Video Start Time: 5:07 p.m.  Video Stop Time: 5:20 p.m.  Provider location: [facility] Epilepsy Care   Reported Patient Location: Home     I spent 31 minutes in this patient care, with 13 minutes in direct patient contact, and 18 minutes in chart review and document preparation on the day of the visit.      Aldo Collins M.D., Professor of Neurology

## 2023-12-06 NOTE — NURSING NOTE
Is the patient currently in the state of [place]? YES    Visit mode:VIDEO    If the visit is dropped, the patient can be reconnected by: VIDEO VISIT: Send to e-mail at: [email or web address]    Will anyone else be joining the visit? NO  (If patient encounters technical issues they should call [phone])    How would you like to obtain your AVS? MyChart    Are changes needed to the allergy or medication list? No    Reason for visit: RECHECK    Medications and allergies have been reviewed.     Blair RYAN

## 2023-12-12 ENCOUNTER — TELEPHONE (OUTPATIENT)
Dept: NEUROLOGY | Facility: CLINIC | Age: 53
End: 2023-12-12

## 2023-12-12 DIAGNOSIS — G40.219 PARTIAL EPILEPSY WITH LOSS OF CONSCIOUSNESS, INTRACTABLE (H): ICD-10-CM

## 2023-12-15 RX ORDER — LEVETIRACETAM 500 MG/1
1000 TABLET ORAL 2 TIMES DAILY
Qty: 360 TABLET | Refills: 3 | Status: SHIPPED | OUTPATIENT
Start: 2023-12-15

## 2023-12-15 NOTE — TELEPHONE ENCOUNTER
Per 12/06 note patient is to Increase levetiracetam to 1000 mg twice daily.     Prescription had two sets of directions. Called pharmacy they had cancelled the order. Writer resent order with correct instructions per note.

## 2024-03-16 ENCOUNTER — HEALTH MAINTENANCE LETTER (OUTPATIENT)
Age: 54
End: 2024-03-16

## 2024-06-21 ENCOUNTER — TELEPHONE (OUTPATIENT)
Dept: NEUROSURGERY | Facility: CLINIC | Age: 54
End: 2024-06-21
Payer: COMMERCIAL

## 2024-06-21 NOTE — TELEPHONE ENCOUNTER
[facility] Call Center    Phone Message    May a detailed message be left on voicemail: yes     Reason for Call: Order(s): Other:   Reason for requested: MRI. The patient's MRI order  before patient could be scheduled due to an appt cancelled because of INS. Patient is requesting another MRI order to be submitted so patient can schedule prior to his upcoming appt. Patient would like a [employer] message so he knows when he can schedule.   Date needed: ASA  Provider name: Kei    Action Taken: Message routed to:  [facility] ([facility] Neurosurgery    Travel Screening: Not Applicable     Date of Service:

## 2024-07-10 ENCOUNTER — HOSPITAL ENCOUNTER (OUTPATIENT)
Dept: MRI IMAGING | Facility: CLINIC | Age: 54
Discharge: HOME | End: 2024-07-10
Attending: NEUROLOGICAL SURGERY | Admitting: NEUROLOGICAL SURGERY
Payer: COMMERCIAL

## 2024-07-10 DIAGNOSIS — G93.89 BRAIN MASS: ICD-10-CM

## 2024-07-10 PROCEDURE — 70553 MRI BRAIN STEM W/O & W/DYE: CPT

## 2024-07-10 PROCEDURE — 255N000002 HC RX 255 OP 636: Performed by: NEUROLOGICAL SURGERY

## 2024-07-10 PROCEDURE — A9585 GADOBUTROL INJECTION: HCPCS | Performed by: NEUROLOGICAL SURGERY

## 2024-07-10 RX ORDER — GADOBUTROL 604.72 MG/ML
7.5 INJECTION INTRAVENOUS ONCE
Status: COMPLETED | OUTPATIENT
Start: 2024-07-10 | End: 2024-07-10

## 2024-07-10 RX ADMIN — GADOBUTROL 6.5 ML: 604.72 INJECTION INTRAVENOUS at 18:01

## 2024-07-16 ENCOUNTER — VIRTUAL VISIT (OUTPATIENT)
Dept: NEUROSURGERY | Facility: CLINIC | Age: 54
End: 2024-07-16
Payer: COMMERCIAL

## 2024-07-16 VITALS — BODY MASS INDEX: 21.66 KG/M2 | HEIGHT: 65 IN | WEIGHT: 130 LBS

## 2024-07-16 DIAGNOSIS — G93.89 BRAIN MASS: Primary | ICD-10-CM

## 2024-07-16 PROCEDURE — 99213 OFFICE O/P EST LOW 20 MIN: CPT | Mod: 95 | Performed by: NEUROLOGICAL SURGERY

## 2024-07-16 ASSESSMENT — PAIN SCALES - GENERAL: PAINLEVEL: NO PAIN (0)

## 2024-07-16 NOTE — PROGRESS NOTES
Virtual Visit Details    Type of service:  Video Visit     Originating Location (pt. Location): Home    Distant Location (provider location):  On-site  Platform used for Video Visit: 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  He tells me that overall he is doing well and does not have any side effects from the Keppra.  He saw Dr. Collins at the end of last year where his dose was increased to 1 g twice daily after a generalized seizure.  He tells me that he dreams a lot more now which she thinks is good.  His job is still stressful and sometimes the schedule is still erratic.  Otherwise life is good and he has no complaints.         Allergies   Allergen Reactions    No Known Drug Allergy        Current Outpatient Medications   Medication Sig Dispense Refill    amoxicillin-clavulanate (AUGMENTIN) 875-125 MG tablet Take 1 tablet by mouth 2 times daily (Patient not taking: Reported on 12/20/2022)      atorvastatin (LIPITOR) 20 MG tablet Take 1 tablet (20 mg) by mouth daily (Patient not taking: Reported on 2/15/2023) 90 tablet 0    clobetasol (TEMOVATE) 0.05 % external cream Apply topically 2 times daily For up to 2 weeks at a time (Patient not taking: Reported on 9/9/2022) 30 g 0    levETIRAcetam (KEPPRA) 500 MG tablet Take 2 tablets (1,000 mg) by mouth 2 times daily 360 tablet 3    tiotropium (SPIRIVA) 18 MCG inhaled capsule Inhale 1 capsule (18 mcg) into the lungs daily (Patient not taking: Reported on 12/20/2022) 30 capsule 5     No current facility-administered medications for this visit.         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  Obviously we do not have a definitive tissue diagnosis but we now have 3 scan showing stability of the lesion over the past couple of years.  Overall his epilepsy seems to be pretty well-controlled on now Keppra 1 g twice daily which is being managed by Dr. Collins.  We discussed follow-up in 2 to 3 years with a repeat MRI of his brain which she is agreeable to.  Otherwise he will follow-up with Dr. Collins      MRI brain with and without contrast in 2-3 years.      Luis Choudhury MD  Department of Neurosurgery  [facility]

## 2024-07-16 NOTE — NURSING NOTE
Current patient location: [address]  [place] [address]    Is the patient currently in the state of [place]? YES    Visit mode:VIDEO    If the visit is dropped, the patient can be reconnected by: VIDEO VISIT: Send to e-mail at: [email or web address]    Will anyone else be joining the visit? NO  (If patient encounters technical issues they should call [phone])    How would you like to obtain your AVS? MyChart    Are changes needed to the allergy or medication list? Pt stated no changes to allergies and Pt stated no med changes    Are refills needed on medications prescribed by this physician? NO    Reason for visit: MILES BOTELLOF

## 2024-07-16 NOTE — LETTER
7/16/2024       RE: Dewey Sultana  [address]  [place] [address]     Dear Colleague,    Thank you for referring your patient, Dewey Sultana, to the [facility] NEUROSURGERY CLINIC [place] at [facility]. Please see a copy of my visit note below.    Virtual Visit Details    Type of service:  Video Visit     Originating Location (pt. 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  He tells me that overall he is doing well and does not have any side effects from the Keppra.  He saw Dr. Collins at the end of last year where his dose was increased to 1 g twice daily after a generalized seizure.  He tells me that he dreams a lot more now which she thinks is good.  His job is still stressful and sometimes the schedule is still erratic.  Otherwise life is good and he has no complaints.         Allergies   Allergen Reactions     No Known Drug Allergy        Current Outpatient Medications   Medication Sig Dispense Refill     amoxicillin-clavulanate (AUGMENTIN) 875-125 MG tablet Take 1 tablet by mouth 2 times daily (Patient not taking: Reported on 12/20/2022)       atorvastatin (LIPITOR) 20 MG tablet Take 1 tablet (20 mg) by mouth daily (Patient not taking: Reported on 2/15/2023) 90 tablet 0     clobetasol (TEMOVATE) 0.05 % external cream Apply topically 2 times daily For up to 2 weeks at a time (Patient not taking: Reported on 9/9/2022) 30 g 0     levETIRAcetam (KEPPRA) 500 MG tablet Take 2 tablets (1,000 mg) by mouth 2 times daily 360 tablet 3     tiotropium (SPIRIVA) 18 MCG inhaled capsule Inhale 1 capsule (18 mcg) into the lungs daily (Patient not taking: Reported on  12/20/2022) 30 capsule 5     No current facility-administered medications for this visit.         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  Obviously we do not have a definitive tissue diagnosis but we now have 3 scan showing stability of the lesion over the past couple of years.  Overall his epilepsy seems to be pretty well-controlled on now Keppra 1 g twice daily which is being managed by Dr. Collins.  We discussed follow-up in 2 to 3 years with a repeat MRI of his brain which she is agreeable to.  Otherwise he will follow-up with Dr. Collins      MRI brain with and without contrast in 2-3 years.      Luis Choudhury MD  Department of Neurosurgery  [facility]

## 2024-09-25 ENCOUNTER — HOSPITAL ENCOUNTER (OUTPATIENT)
Dept: GENERAL RADIOLOGY | Facility: CLINIC | Age: 54
Discharge: HOME | End: 2024-09-25
Attending: NURSE PRACTITIONER | Admitting: NURSE PRACTITIONER
Payer: COMMERCIAL

## 2024-09-25 ENCOUNTER — OFFICE VISIT (OUTPATIENT)
Dept: FAMILY MEDICINE | Facility: CLINIC | Age: 54
End: 2024-09-25
Payer: COMMERCIAL

## 2024-09-25 VITALS
WEIGHT: 125.8 LBS | RESPIRATION RATE: 12 BRPM | DIASTOLIC BLOOD PRESSURE: 68 MMHG | HEIGHT: 66 IN | HEART RATE: 72 BPM | BODY MASS INDEX: 20.22 KG/M2 | TEMPERATURE: 96.9 F | SYSTOLIC BLOOD PRESSURE: 116 MMHG | OXYGEN SATURATION: 98 %

## 2024-09-25 DIAGNOSIS — Z28.21 IMMUNIZATION DECLINED: ICD-10-CM

## 2024-09-25 DIAGNOSIS — M79.89 SWELLING OF RIGHT MIDDLE FINGER: Primary | ICD-10-CM

## 2024-09-25 DIAGNOSIS — F17.200 TOBACCO USE DISORDER: ICD-10-CM

## 2024-09-25 DIAGNOSIS — M79.89 SWELLING OF RIGHT MIDDLE FINGER: ICD-10-CM

## 2024-09-25 DIAGNOSIS — Z82.69 FAMILY HISTORY OF OSTEOARTHRITIS: ICD-10-CM

## 2024-09-25 PROCEDURE — 73140 X-RAY EXAM OF FINGER(S): CPT | Mod: RT

## 2024-09-25 PROCEDURE — 99213 OFFICE O/P EST LOW 20 MIN: CPT | Performed by: NURSE PRACTITIONER

## 2024-09-25 PROCEDURE — G2211 COMPLEX E/M VISIT ADD ON: HCPCS | Performed by: NURSE PRACTITIONER

## 2024-09-25 ASSESSMENT — PAIN SCALES - GENERAL: PAINLEVEL: SEVERE PAIN (7)

## 2024-09-25 NOTE — PROGRESS NOTES
Assessment & Plan     Swelling of right middle finger    Right middle finger swelling and pain. X-rays obtained today show some narrowing of joint spacing, radiology report pending. Discussed I do believe his symptoms are likely related to osteoarthritis. Discussed conservative management with ice or heat application, paraffin treatments, Tylenol or Ibuprofen, bracing if needed and hand exercises. If symptoms do not improve, would recommend Orthopedic evaluation. He does request today to see Orthopedics for further evaluation and discussion of more aggressive management. This referral was placed and he was able to be scheduled tomorrow.     - XR Finger Right G/E 2 Views; Future  - Orthopedic  Referral; Future    Family history of osteoarthritis    Immunization declined  Discussed recommended immunizations for age and comorbidity, patient declined today.     Nicotine/Tobacco Cessation  He reports that he has been smoking cigarettes. He has a 20 pack-year smoking history. He has been exposed to tobacco smoke. He has never used smokeless tobacco.  Nicotine/Tobacco Cessation Plan  Information offered: Patient not interested at this time    I explained my diagnostic considerations and recommendations to the patient, who voiced understanding and agreement with the assessment and treatment plan. All questions were answered to patient's apparent satisfaction. We discussed potential side effects of any prescribed or recommended therapies, as well as expectations for response to treatments and importance of lifestyle measures that may improve symptoms. Patient was advised to contact our office if there are new symptoms or no improvement or worsening of conditions or symptoms.    The longitudinal plan of care for the diagnosis(es)/condition(s) as documented were addressed during this visit. Due to the added complexity in care, I will continue to support Dewey in the subsequent management and with ongoing continuity  "of care.        Subjective   Dewey is a 54 year old, presenting for the following health issues:  Finger        9/25/2024     7:49 AM   Additional Questions   Roomed by Krista ACOSTA     History of Present Illness       Reason for visit:  Hand pain,stiff  Symptom onset:  3-4 weeks ago He is missing 7 dose(s) of medications per week.     Dewey presents today with concerns of swelling at the middle knuckle on his right middle finger. He has been noticing the swelling developing over the past several months. The joint is \"always tender\", worse with certain movement or touch. He has not used any Tylenol or Ibuprofen for his symptoms. He denies any injury or trauma. He has recently started a new job assembling machines. He has some drilling and tapping with a cordless drill with this. He also notes a recent history of numbness in his finger tips, specifically when exposed to cold weather. He reports family history of osteoarthritis in his father with symptoms in his bilateral hands. He denies any additional concerns today.     Patient Active Problem List   Diagnosis    Tobacco use disorder    Esophageal reflux    Depressive disorder, not elsewhere classified    CARDIOVASCULAR SCREENING; LDL GOAL LESS THAN 130    Excessive caffeine intake    Partial epilepsy with loss of consciousness, intractable (H)     Current Outpatient Medications   Medication Sig Dispense Refill    levETIRAcetam (KEPPRA) 500 MG tablet Take 2 tablets (1,000 mg) by mouth 2 times daily 360 tablet 3    amoxicillin-clavulanate (AUGMENTIN) 875-125 MG tablet Take 1 tablet by mouth 2 times daily (Patient not taking: Reported on 12/20/2022)      atorvastatin (LIPITOR) 20 MG tablet Take 1 tablet (20 mg) by mouth daily (Patient not taking: Reported on 2/15/2023) 90 tablet 0    clobetasol (TEMOVATE) 0.05 % external cream Apply topically 2 times daily For up to 2 weeks at a time (Patient not taking: Reported on 9/9/2022) 30 g 0    tiotropium (SPIRIVA) 18 MCG inhaled " "capsule Inhale 1 capsule (18 mcg) into the lungs daily (Patient not taking: Reported on 12/20/2022) 30 capsule 5     No current facility-administered medications for this visit.         Review of Systems  Constitutional, HEENT, cardiovascular, pulmonary, gi and gu systems are negative, except as otherwise noted.      Objective    /68   Pulse 72   Temp 96.9  F (36.1  C) (Temporal)   Resp 12   Ht 1.681 m (5' 6.2\")   Wt 57.1 kg (125 lb 12.8 oz)   SpO2 98%   BMI 20.18 kg/m    Body mass index is 20.18 kg/m .  Physical Exam  Vitals reviewed.   Constitutional:       General: He is not in acute distress.     Appearance: Normal appearance.   Pulmonary:      Effort: Pulmonary effort is normal.   Musculoskeletal:      Right hand: Swelling present. Decreased range of motion. Normal strength. Normal sensation. Normal capillary refill. Normal pulse.      Comments: Edema to the right middle finger, most prominent around PIP and DIP joints. No erythema or warmth to touch. Mildly tender. Unable to fully contract the finger due to swelling.    Skin:     General: Skin is warm and dry.      Capillary Refill: Capillary refill takes less than 2 seconds.   Neurological:      Mental Status: He is alert and oriented to person, place, and time. Mental status is at baseline.   Psychiatric:         Mood and Affect: Mood normal.         Behavior: Behavior normal.                    Signed Electronically by: Kyra Crane NP    "

## 2024-09-26 ENCOUNTER — PATIENT OUTREACH (OUTPATIENT)
Dept: CARE COORDINATION | Facility: CLINIC | Age: 54
End: 2024-09-26

## 2024-09-26 ENCOUNTER — OFFICE VISIT (OUTPATIENT)
Dept: ORTHOPEDICS | Facility: CLINIC | Age: 54
End: 2024-09-26
Payer: COMMERCIAL

## 2024-09-26 VITALS
HEIGHT: 66 IN | BODY MASS INDEX: 20.22 KG/M2 | DIASTOLIC BLOOD PRESSURE: 72 MMHG | WEIGHT: 125.8 LBS | TEMPERATURE: 96.9 F | SYSTOLIC BLOOD PRESSURE: 109 MMHG

## 2024-09-26 DIAGNOSIS — M79.89 SWELLING OF RIGHT MIDDLE FINGER: Primary | ICD-10-CM

## 2024-09-26 PROCEDURE — 99203 OFFICE O/P NEW LOW 30 MIN: CPT | Performed by: ORTHOPAEDIC SURGERY

## 2024-09-26 RX ORDER — DICLOFENAC SODIUM 75 MG/1
75 TABLET, DELAYED RELEASE ORAL 2 TIMES DAILY
Qty: 28 TABLET | Refills: 0 | Status: SHIPPED | OUTPATIENT
Start: 2024-09-26

## 2024-09-26 RX ORDER — PREDNISONE 20 MG/1
TABLET ORAL
Qty: 20 TABLET | Refills: 0 | Status: CANCELLED | OUTPATIENT
Start: 2024-09-26

## 2024-09-26 NOTE — LETTER
9/26/2024      Dewey Sultana  [address]  [facility] [address]      Dear Colleague,    Thank you for referring your patient, Dewey Sultana, to the [facility]. Please see a copy of my visit note below.    ORTHOPEDIC CONSULT      Chief Complaint: Dewey Sultana is a 54 year old male who is being seen for   Chief Complaint   Patient presents with     Right Hand - Edema, Pain     Middle finger        History of Present Illness:   Presents with 3 weeks worth of swelling and pain to his right middle finger.  Does not recall any trauma.  Mainly centered around the PIP joint.  Describes stiffness.  Also feels some similar stiffness developing in the contralateral side but no swelling.  He is utilize some ice.  He is not taking any medications for.  He is never had this happen before.  Does have issues with his hips and arms hurting at times.  Family history of rheumatoid arthritis.  No other significant joint pains.      Patient's past medical, surgical, social and family histories reviewed.     Past Medical History:   Diagnosis Date     Depressive disorder, not elsewhere classified      Excessive caffeine intake 12/6/2022     Unspecified hemorrhoids without mention of complication     Hemorrhoids       Past Surgical History:   Procedure Laterality Date     COLONOSCOPY  07/07/2006     HC ANOSCOPY W BIOPSY SINGLE/MULTIPLE  06/09/06    Large internal hemorrhoids with a component of rectal prolapse     HC INCISION TENDON SHEATH FINGER      Trigger finger release, right.       Medications:  Current Outpatient Medications   Medication Sig Dispense Refill     diclofenac (VOLTAREN) 75 MG EC tablet Take 1 tablet (75 mg) by mouth 2 times daily. 28 tablet 0     levETIRAcetam (KEPPRA) 500 MG tablet Take 2 tablets (1,000 mg) by mouth 2 times daily 360 tablet 3     No current facility-administered medications for this visit.       Allergies   Allergen Reactions     No Known Drug Allergy   "      Social History     Occupational History     Not on file   Tobacco Use     Smoking status: Every Day     Current packs/day: 1.00     Average packs/day: 1 pack/day for 20.0 years (20.0 ttl pk-yrs)     Types: Cigarettes     Passive exposure: Current     Smokeless tobacco: Never   Vaping Use     Vaping status: Never Used   Substance and Sexual Activity     Alcohol use: Yes     Drug use: No     Sexual activity: Yes     Partners: Female       Family History   Problem Relation Age of Onset     Diabetes Brother      Alcohol/Drug Brother        REVIEW OF SYSTEMS  10 point review systems performed otherwise negative as noted as per history of present illness.    Physical Exam:  Vitals: /72   Temp 96.9  F (36.1  C)   Ht 1.681 m (5' 6.2\")   Wt 57.1 kg (125 lb 12.8 oz)   BMI 20.18 kg/m    BMI= Body mass index is 20.18 kg/m .  Constitutional: healthy, alert and no acute distress   Psychiatric: mentation appears normal and affect normal/bright  NEURO: no focal deficits  RESP: Normal with easy respirations and no use of accessory muscles to breathe, no audible wheezing or retractions  CV: RUE:  no edema         Regular rate and rhythm by palpation  SKIN: No erythema, rashes, excoriation, or breakdown. No evidence of infection.   JOINT/EXTREMITIES:right hand: Shows some focal swelling throughout the middle finger.  More centered around the PIP joint.  Generalized tenderness with palpation of the PIP joint with no focal findings.  There is no abscess or cyst formation noted.  Flexors and extensors are intact although significant tightness with motion.  No instability with radial and ulnar deviation PIP joint.  No evidence of flexor purulent tenosynovitis.     GAIT: not tested     Diagnostic Modalities:  Right middle finger x-ray: Taken September 25 shows no acute fractures or dislocations.  No masses or lesions.  No significant degenerative changes  Independent visualization of the images was " performed.      Impression: right middle finger swelling-PIP synovitis?    Plan:  All of the above pertinent physical exam and imaging modalities findings was reviewed with Dewey.    Options discussed with Dewey.  No history of trauma.  There is no red flags including infection exam.  Radiographs unremarkable.  He has not tried any medications.  Recommend a trial of anti-inflammatories for the next 2 weeks.  I recommend he give us a call in 10 days or MyChart us with an update.  If he continues to have issues would recommend a prednisone taper pack and a referral to rheumatology.  He is agreeable to the plan.        Return to clinic notify us in 2 weeks on progress, or sooner as needed for changes.  Re-x-ray on return: No    Zia Mckeon D.O.      Again, thank you for allowing me to participate in the care of your patient.        Sincerely,        Galo Mckeon, DO

## 2024-09-26 NOTE — PROGRESS NOTES
ORTHOPEDIC CONSULT      Chief Complaint: Dewey Sultana is a 54 year old male who is being seen for   Chief Complaint   Patient presents with    Right Hand - Edema, Pain     Middle finger        History of Present Illness:   Presents with 3 weeks worth of swelling and pain to his right middle finger.  Does not recall any trauma.  Mainly centered around the PIP joint.  Describes stiffness.  Also feels some similar stiffness developing in the contralateral side but no swelling.  He is utilize some ice.  He is not taking any medications for.  He is never had this happen before.  Does have issues with his hips and arms hurting at times.  Family history of rheumatoid arthritis.  No other significant joint pains.      Patient's past medical, surgical, social and family histories reviewed.     Past Medical History:   Diagnosis Date    Depressive disorder, not elsewhere classified     Excessive caffeine intake 12/6/2022    Unspecified hemorrhoids without mention of complication     Hemorrhoids       Past Surgical History:   Procedure Laterality Date    COLONOSCOPY  07/07/2006    HC ANOSCOPY W BIOPSY SINGLE/MULTIPLE  06/09/06    Large internal hemorrhoids with a component of rectal prolapse    HC INCISION TENDON SHEATH FINGER      Trigger finger release, right.       Medications:  Current Outpatient Medications   Medication Sig Dispense Refill    diclofenac (VOLTAREN) 75 MG EC tablet Take 1 tablet (75 mg) by mouth 2 times daily. 28 tablet 0    levETIRAcetam (KEPPRA) 500 MG tablet Take 2 tablets (1,000 mg) by mouth 2 times daily 360 tablet 3     No current facility-administered medications for this visit.       Allergies   Allergen Reactions    No Known Drug Allergy        Social History     Occupational History    Not on file   Tobacco Use    Smoking status: Every Day     Current packs/day: 1.00     Average packs/day: 1 pack/day for 20.0 years (20.0 ttl pk-yrs)     Types: Cigarettes     Passive exposure: Current     "Smokeless tobacco: Never   Vaping Use    Vaping status: Never Used   Substance and Sexual Activity    Alcohol use: Yes    Drug use: No    Sexual activity: Yes     Partners: Female       Family History   Problem Relation Age of Onset    Diabetes Brother     Alcohol/Drug Brother        REVIEW OF SYSTEMS  10 point review systems performed otherwise negative as noted as per history of present illness.    Physical Exam:  Vitals: /72   Temp 96.9  F (36.1  C)   Ht 1.681 m (5' 6.2\")   Wt 57.1 kg (125 lb 12.8 oz)   BMI 20.18 kg/m    BMI= Body mass index is 20.18 kg/m .  Constitutional: healthy, alert and no acute distress   Psychiatric: mentation appears normal and affect normal/bright  NEURO: no focal deficits  RESP: Normal with easy respirations and no use of accessory muscles to breathe, no audible wheezing or retractions  CV: RUE:  no edema         Regular rate and rhythm by palpation  SKIN: No erythema, rashes, excoriation, or breakdown. No evidence of infection.   JOINT/EXTREMITIES:right hand: Shows some focal swelling throughout the middle finger.  More centered around the PIP joint.  Generalized tenderness with palpation of the PIP joint with no focal findings.  There is no abscess or cyst formation noted.  Flexors and extensors are intact although significant tightness with motion.  No instability with radial and ulnar deviation PIP joint.  No evidence of flexor purulent tenosynovitis.     GAIT: not tested     Diagnostic Modalities:  Right middle finger x-ray: Taken September 25 shows no acute fractures or dislocations.  No masses or lesions.  No significant degenerative changes  Independent visualization of the images was performed.      Impression: right middle finger swelling-PIP synovitis?    Plan:  All of the above pertinent physical exam and imaging modalities findings was reviewed with Dewey.    Options discussed with Dewey.  No history of trauma.  There is no red flags including infection exam.  " Radiographs unremarkable.  He has not tried any medications.  Recommend a trial of anti-inflammatories for the next 2 weeks.  I recommend he give us a call in 10 days or MyChart us with an update.  If he continues to have issues would recommend a prednisone taper pack and a referral to rheumatology.  He is agreeable to the plan.        Return to clinic notify us in 2 weeks on progress, or sooner as needed for changes.  Re-x-ray on return: No    Zia Mckeon D.O.

## 2024-09-30 ENCOUNTER — PATIENT OUTREACH (OUTPATIENT)
Dept: CARE COORDINATION | Facility: CLINIC | Age: 54
End: 2024-09-30
Payer: COMMERCIAL

## 2024-10-14 DIAGNOSIS — M79.89 SWELLING OF RIGHT MIDDLE FINGER: ICD-10-CM

## 2024-10-14 NOTE — TELEPHONE ENCOUNTER
"The plan from patient's last visit on 9/26/24 was as follows:     \"Options discussed with Dewey.  No history of trauma.  There is no red flags including infection exam.  Radiographs unremarkable.  He has not tried any medications.  Recommend a trial of anti-inflammatories for the next 2 weeks.  I recommend he give us a call in 10 days or MyChart us with an update.  If he continues to have issues would recommend a prednisone taper pack and a referral to rheumatology.  He is agreeable to the plan.\"    Patient does have follow-up appointment with Dr. Mckeon scheduled for 10/18/25. Can refill of Voltaren be provided prior to appointment?     Cherise Rosas RN   [facility]  "

## 2024-10-14 NOTE — TELEPHONE ENCOUNTER
Reason for Call:  Other prescription    Detailed comments: diclofenac (VOLTAREN) 75 MG EC tablet .  Patient was calling to update Dr Mckeon regarding the medication.  It is working very well. Taking down the inflammation and pain.  Once stopped symptoms returned within a couple of days.  He would like a refill of the medication.    Scheduling an appointment for a follow up.    Please send to:  [facility] [place]    Phone Number Patient can be reached at: Cell number on file:    Telephone Information:   Mobile [phone]       Best Time: anytime    Can we leave a detailed message on this number? YES    Call taken on 10/14/2024 at 12:31 PM by Becky Fang

## 2024-10-15 RX ORDER — DICLOFENAC SODIUM 75 MG/1
75 TABLET, DELAYED RELEASE ORAL 2 TIMES DAILY
Qty: 28 TABLET | Refills: 0 | Status: SHIPPED | OUTPATIENT
Start: 2024-10-15

## 2024-10-15 NOTE — TELEPHONE ENCOUNTER
Attempted to call patient, no answer. Left voicemail advising that refill has been approved and requested patient call back at [phone] should he have any additional questions.       Cherise Rosas RN   [facility]

## 2024-10-17 NOTE — PROGRESS NOTES
Office Visit-Follow up    Chief Complaint: Dewey Sultana is a 54 year old male who is being seen for   Chief Complaint   Patient presents with    Right Hand - Follow Up       History of Present Illness:   Today's visit:  Got very good relief with the voltaren PO, swelling, stiffness, and pain improved.   The Rx ended and started to notice swelling and stiffness returning. Did get a refill a couple of days ago, just started taking it again and helping. No redness.   He also notes was having some generalized stiffness to the hips. Non-radiating. Better with voltaren. Started to bother him again but denies pain.  He also notes has had long standing issues with pain and tightness through the palm along the middle MC to the left hand.      September 26, 2024 visit:  Presents with 3 weeks worth of swelling and pain to his right middle finger. Does not recall any trauma. Mainly centered around the PIP joint. Describes stiffness. Also feels some similar stiffness developing in the contralateral side but no swelling. He is utilize some ice. He is not taking any medications for. He is never had this happen before. Does have issues with his hips and arms hurting at times. Family history of rheumatoid arthritis. No other significant joint pains.       Social History     Occupational History    Not on file   Tobacco Use    Smoking status: Every Day     Current packs/day: 1.00     Average packs/day: 1 pack/day for 20.0 years (20.0 ttl pk-yrs)     Types: Cigarettes     Passive exposure: Current    Smokeless tobacco: Never   Vaping Use    Vaping status: Never Used   Substance and Sexual Activity    Alcohol use: Yes    Drug use: No    Sexual activity: Yes     Partners: Female       REVIEW OF SYSTEMS  General: negative for, night sweats, dizziness, fatigue  Resp: No shortness of breath and no cough  CV: negative for chest pain, syncope or near-syncope  GI: negative for nausea, vomiting and diarrhea  : negative for dysuria and  hematuria  Musculoskeletal: as above  Neurologic: negative for syncope   Hematologic: negative for bleeding disorder    Physical Exam:  Vitals: /77 (BP Location: Right arm, Patient Position: Sitting, Cuff Size: Adult Regular)   Temp 98.1  F (36.7  C) (Temporal)   Wt 56.7 kg (125 lb)   BMI 20.05 kg/m    BMI= Body mass index is 20.05 kg/m .  Constitutional: healthy, alert and no acute distress   Psychiatric: mentation appears normal and affect normal/bright  NEURO: no focal deficits  RESP: Normal with easy respirations and no use of accessory muscles to breathe, no audible wheezing or retractions  CV: No peripheral edema         Regular rate and rhythm by palpation  SKIN: No erythema, rashes, excoriation, or breakdown. No evidence of infection.   JOINT/EXTREMITIES:right hand: mild swelling to the right middle finger. No erythema. No evidence of infection. Some tightness/stiffness and mild pain along the middle phalanx with flexion. Cannot fully touch the middle finger to the palm.     Left hand: palpable cord, consistent with dupuytren's contracture to the left palm along the 3rd metacarpal. Some tenderness.  No swelling to the finger. Motion intact. Sensation intact. Skin pink warm dry.   GAIT: not tested             Diagnostic Modalities:  None today.  Independent visualization of the images was performed.      Impression: right middle finger swelling-PIP synovitis?  Left hand dupytrene's contracture  Bilateral hip stiffness.      Plan:  All of the above pertinent physical exam and imaging modalities findings was reviewed with Dewey. Regarding the right hand. No evidence of infection. Did do quite a bit better with antiinflammatory. This was refilled. Discussed would refill one more time then would need to transition to PCP if on it longer, did discuss hand therapy as well. He is thinking about this.  Regarding the left hand. Consistent with dupuytren's contracture, he declines further workup or referral for  this.  Regarding the hips, the pain is mostly later. Non-radiating. No groin pain. Declines imaging, assessment, or evaluation today. Suggested to return to clinic if he would like his hips formally evaluate. Very mildly bothersome and improved with inflammatory.     Return to clinic: can call any time for hand therapy for the right finger stiffness/swelling, if he wants management of the left hand, he would see Dr. Noland, can return any time for formal evaluation of the hips, if the right finger does not continue to improve or worsen return for that , or sooner as needed for changes.  Re-x-ray on return: Yes if returning for hips.     Dr. Mckeon was present in the office.  He personally discussed the care plan and reviewed all appropriate imaging.    LAMONT Wilkins, CNP  Orthopedic Surgery

## 2024-10-18 ENCOUNTER — OFFICE VISIT (OUTPATIENT)
Dept: ORTHOPEDICS | Facility: CLINIC | Age: 54
End: 2024-10-18
Payer: COMMERCIAL

## 2024-10-18 VITALS
TEMPERATURE: 98.1 F | WEIGHT: 125 LBS | DIASTOLIC BLOOD PRESSURE: 77 MMHG | SYSTOLIC BLOOD PRESSURE: 121 MMHG | BODY MASS INDEX: 20.05 KG/M2

## 2024-10-18 DIAGNOSIS — M79.89 SWELLING OF RIGHT MIDDLE FINGER: Primary | ICD-10-CM

## 2024-10-18 PROCEDURE — 99213 OFFICE O/P EST LOW 20 MIN: CPT | Performed by: ORTHOPAEDIC SURGERY

## 2024-10-18 ASSESSMENT — PAIN SCALES - GENERAL: PAINLEVEL: SEVERE PAIN (7)

## 2024-10-18 NOTE — LETTER
10/18/2024      Dewey Sultana  [address]  [facility] [address]      Dear Colleague,    Thank you for referring your patient, Dewey Sultana, to the [facility]. Please see a copy of my visit note below.    Office Visit-Follow up    Chief Complaint: Dewey Sultana is a 54 year old male who is being seen for   Chief Complaint   Patient presents with     Right Hand - Follow Up       History of Present Illness:   Today's visit:  Got very good relief with the voltaren PO, swelling, stiffness, and pain improved.   The Rx ended and started to notice swelling and stiffness returning. Did get a refill a couple of days ago, just started taking it again and helping. No redness.   He also notes was having some generalized stiffness to the hips. Non-radiating. Better with voltaren. Started to bother him again but denies pain.  He also notes has had long standing issues with pain and tightness through the palm along the middle MC to the left hand.      September 26, 2024 visit:  Presents with 3 weeks worth of swelling and pain to his right middle finger. Does not recall any trauma. Mainly centered around the PIP joint. Describes stiffness. Also feels some similar stiffness developing in the contralateral side but no swelling. He is utilize some ice. He is not taking any medications for. He is never had this happen before. Does have issues with his hips and arms hurting at times. Family history of rheumatoid arthritis. No other significant joint pains.       Social History     Occupational History     Not on file   Tobacco Use     Smoking status: Every Day     Current packs/day: 1.00     Average packs/day: 1 pack/day for 20.0 years (20.0 ttl pk-yrs)     Types: Cigarettes     Passive exposure: Current     Smokeless tobacco: Never   Vaping Use     Vaping status: Never Used   Substance and Sexual Activity     Alcohol use: Yes     Drug use: No     Sexual activity: Yes     Partners: Female        REVIEW OF SYSTEMS  General: negative for, night sweats, dizziness, fatigue  Resp: No shortness of breath and no cough  CV: negative for chest pain, syncope or near-syncope  GI: negative for nausea, vomiting and diarrhea  : negative for dysuria and hematuria  Musculoskeletal: as above  Neurologic: negative for syncope   Hematologic: negative for bleeding disorder    Physical Exam:  Vitals: /77 (BP Location: Right arm, Patient Position: Sitting, Cuff Size: Adult Regular)   Temp 98.1  F (36.7  C) (Temporal)   Wt 56.7 kg (125 lb)   BMI 20.05 kg/m    BMI= Body mass index is 20.05 kg/m .  Constitutional: healthy, alert and no acute distress   Psychiatric: mentation appears normal and affect normal/bright  NEURO: no focal deficits  RESP: Normal with easy respirations and no use of accessory muscles to breathe, no audible wheezing or retractions  CV: No peripheral edema         Regular rate and rhythm by palpation  SKIN: No erythema, rashes, excoriation, or breakdown. No evidence of infection.   JOINT/EXTREMITIES:right hand: mild swelling to the right middle finger. No erythema. No evidence of infection. Some tightness/stiffness and mild pain along the middle phalanx with flexion. Cannot fully touch the middle finger to the palm.     Left hand: palpable cord, consistent with dupuytren's contracture to the left palm along the 3rd metacarpal. Some tenderness.  No swelling to the finger. Motion intact. Sensation intact. Skin pink warm dry.   GAIT: not tested             Diagnostic Modalities:  None today.  Independent visualization of the images was performed.      Impression: right middle finger swelling-PIP synovitis?  Left hand dupytrene's contracture  Bilateral hip stiffness.      Plan:  All of the above pertinent physical exam and imaging modalities findings was reviewed with Dewey. Regarding the right hand. No evidence of infection. Did do quite a bit better with antiinflammatory. This was refilled.  Discussed would refill one more time then would need to transition to PCP if on it longer, did discuss hand therapy as well. He is thinking about this.  Regarding the left hand. Consistent with dupuytren's contracture, he declines further workup or referral for this.  Regarding the hips, the pain is mostly later. Non-radiating. No groin pain. Declines imaging, assessment, or evaluation today. Suggested to return to clinic if he would like his hips formally evaluate. Very mildly bothersome and improved with inflammatory.     Return to clinic: can call any time for hand therapy for the right finger stiffness/swelling, if he wants management of the left hand, he would see Dr. Noland, can return any time for formal evaluation of the hips, if the right finger does not continue to improve or worsen return for that , or sooner as needed for changes.  Re-x-ray on return: Yes if returning for hips.     Dr. Mckeon was present in the office.  He personally discussed the care plan and reviewed all appropriate imaging.    LAMONT Wilkins, CNP  Orthopedic Surgery      Again, thank you for allowing me to participate in the care of your patient.        Sincerely,        Galo Mckeon, DO

## 2024-11-19 DIAGNOSIS — M79.89 SWELLING OF RIGHT MIDDLE FINGER: ICD-10-CM

## 2024-11-19 RX ORDER — DICLOFENAC SODIUM 75 MG/1
75 TABLET, DELAYED RELEASE ORAL 2 TIMES DAILY
Qty: 28 TABLET | Refills: 0 | OUTPATIENT
Start: 2024-11-19

## 2024-11-19 NOTE — TELEPHONE ENCOUNTER
Routing refill request to provider for review/approval because:   Normal CBC on file in past 12 months    Always Fail Criteria - Chart Review Required    Normal GFR on file in past 12 months     Nohemi Vargas RN on 11/19/2024 at 8:18 AM

## 2024-11-19 NOTE — TELEPHONE ENCOUNTER
Diclofenac      Last Written Prescription Date:  10/15/24  Last Fill Quantity: 28,   # refills: 0  Last Office Visit: 10/18/2024  Future Office visit:

## 2024-11-19 NOTE — TELEPHONE ENCOUNTER
The patient will need to have further refills of Voltaren done by PCP for long term monitoring and management. We did discuss this at his last visit.      LAMONT Wilkins, CNP  Orthopedic Surgery

## 2024-11-26 NOTE — TELEPHONE ENCOUNTER
I faxed refill request back to [facility] telling them he needs to be seen to discuss staying on medication.

## 2024-12-07 ENCOUNTER — MYC MEDICAL ADVICE (OUTPATIENT)
Dept: ORTHOPEDICS | Facility: CLINIC | Age: 54
End: 2024-12-07

## 2024-12-07 DIAGNOSIS — M79.89 SWELLING OF RIGHT MIDDLE FINGER: ICD-10-CM

## 2024-12-07 DIAGNOSIS — G40.219 PARTIAL EPILEPSY WITH LOSS OF CONSCIOUSNESS, INTRACTABLE (H): ICD-10-CM

## 2024-12-09 RX ORDER — DICLOFENAC SODIUM 75 MG/1
75 TABLET, DELAYED RELEASE ORAL 2 TIMES DAILY
Qty: 28 TABLET | Refills: 0 | Status: SHIPPED | OUTPATIENT
Start: 2024-12-09

## 2024-12-11 ENCOUNTER — MYC REFILL (OUTPATIENT)
Dept: NEUROLOGY | Facility: CLINIC | Age: 54
End: 2024-12-11

## 2024-12-11 DIAGNOSIS — G40.219 PARTIAL EPILEPSY WITH LOSS OF CONSCIOUSNESS, INTRACTABLE (H): ICD-10-CM

## 2024-12-11 RX ORDER — LEVETIRACETAM 500 MG/1
1000 TABLET ORAL 2 TIMES DAILY
Qty: 360 TABLET | Refills: 0 | Status: SHIPPED | OUTPATIENT
Start: 2024-12-11

## 2024-12-11 NOTE — TELEPHONE ENCOUNTER
Last seen: 12/06/2023  RTC: 4 months from 12/06/2023  Cancel: No  No-show: No  Next appt: 02/27/2025    Incoming refill from pt via [facility]    Medication requested: levETIRAcetam (KEPPRA) 500 MG tablet   Directions: Take 2 tablets (1,000 mg) by mouth 2 times daily  Qty: 360 tablet with 0 refills  Last refilled: 12/15/2023    Lab renewed per protocol.    Medication refill approved per refill protocol

## 2024-12-12 RX ORDER — LEVETIRACETAM 500 MG/1
1000 TABLET ORAL 2 TIMES DAILY
Qty: 360 TABLET | Refills: 3 | OUTPATIENT
Start: 2024-12-12

## 2024-12-26 NOTE — TELEPHONE ENCOUNTER
Fax received for refill 12/26. Per last [facility] message, Rx refill was denied. Appointment with PCP needed for refill of Voltaren.     Renate Mack, ATC

## 2025-01-02 ENCOUNTER — LAB (OUTPATIENT)
Dept: LAB | Facility: CLINIC | Age: 55
End: 2025-01-02
Payer: COMMERCIAL

## 2025-01-02 DIAGNOSIS — G40.219 PARTIAL EPILEPSY WITH LOSS OF CONSCIOUSNESS, INTRACTABLE (H): ICD-10-CM

## 2025-01-02 DIAGNOSIS — M79.89 SWELLING OF RIGHT MIDDLE FINGER: ICD-10-CM

## 2025-01-02 LAB
CREAT SERPL-MCNC: 0.83 MG/DL (ref 0.67–1.17)
CRP SERPL-MCNC: <3 MG/L
EGFRCR SERPLBLD CKD-EPI 2021: >90 ML/MIN/1.73M2
ERYTHROCYTE [SEDIMENTATION RATE] IN BLOOD BY WESTERGREN METHOD: 7 MM/HR (ref 0–20)

## 2025-01-03 PROBLEM — M72.0 DUPUYTREN'S CONTRACTURE: Status: ACTIVE | Noted: 2025-01-03

## 2025-02-27 ENCOUNTER — OFFICE VISIT (OUTPATIENT)
Dept: NEUROLOGY | Facility: CLINIC | Age: 55
End: 2025-02-27

## 2025-02-27 VITALS
OXYGEN SATURATION: 97 % | WEIGHT: 133.4 LBS | SYSTOLIC BLOOD PRESSURE: 131 MMHG | BODY MASS INDEX: 21.44 KG/M2 | DIASTOLIC BLOOD PRESSURE: 83 MMHG | TEMPERATURE: 98.7 F | HEIGHT: 66 IN | HEART RATE: 63 BPM

## 2025-02-27 DIAGNOSIS — G40.219 PARTIAL EPILEPSY WITH LOSS OF CONSCIOUSNESS, INTRACTABLE (H): ICD-10-CM

## 2025-02-27 RX ORDER — LEVETIRACETAM 500 MG/1
1000 TABLET ORAL 2 TIMES DAILY
Qty: 360 TABLET | Refills: 3 | Status: SHIPPED | OUTPATIENT
Start: 2025-02-27

## 2025-02-27 NOTE — PROGRESS NOTES
[facility]:  RETURN VISIT          Service Date: 02/27/2025     HISTORY:  Mr. 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  He came alone to the visit today.     He reported that he has not had any seizures, loss of consciousness, or other paroxysmal phenomena, following the most recent visit to this clinic on 12/06/2023.       The most recent grand mal seizure occurred on 08/02/2023; the levetiracetam level was 4.2 mcg/ml that day, and he denied missing doses before the seizure.  The levetiracetam dose was increased after this seizure.    The most recent event with an aura and impaired speech occurred during the last week of January 2023.       He denied adverse effects of levetiracetam, which he is taking twice daily as prescribed.       Ictal semiology-history:   The patient confirmed previously reported history today.  He confirmed that he was having stereotyped paroxysmal events with primarily subjective phenomena since 2006, and he had a first and only generalized tonic-clonic seizure in December 2022.     The non-convulsive events have been going on for at least several times per year since onset in 2006.  At times, they were significantly more frequent, up to twice per day for brief periods.  One occurred during the last week of 01/2023.  These always began with a peculiar, but highly stereotyped mental feeling that he has just been startled and is physically unsettled.  He then feels compelled to take repeated deep breaths, although he never feels short of breath.  This lasts for approximately 1 minute, after which he returns to baseline, with no apparent postictal state.  His daughter has seen a number of these events, and she consistently notes that he has slurred speech, although speech content is not confused, and he often has left periocular twitching repeatedly, during the phase where he is taking repeated deep breaths.   Recently, a coworker told him that during one of these events, the coworker also witnessed left periocular twitching and slurred speech.  The patient himself is not aware of left facial movements or speech alteration.     The patient had a witnessed generalized tonic-clonic seizure on 2022.  His daughter heard noises in his bedroom, and went in to observe him to be stiffly extended with generalized jerking for about a minute.  Afterwards, he was quite confused for many minutes and then mildly confused for another hour.  He was found to have bitten his tongue with blood in his mouth.  Approximately 5 days later, he went to the emergency room for a consultation regarding this episode, at which time he had CT scanning and then a brain MRI, which showed the right hippocampal lesion.     The patient denied that he has ever had a non-convulsive falling with unconsciousness, repetitive involuntary movements or posturing, sudden brief confusion, or other paroxysmal phenomena.  He denied any history of sudden involuntary jerks while fully awake, but he has had hypnic jerks.       The patient does not recognize exacerbating or remitting factors with regard to seizure frequency.      Epilepsy-seizure predispositions:   The patient has no family history of epilepsy or seizures.  He has no history of gestational or  injury, febrile convulsions, developmental delay, stroke, meningitis, encephalitis, significant head injury, or other epileptic predispositions except for a brain MRI lesion.       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  The brain MRI also showed a cortical site of signal alteration in the right paracentral lobule and another  "area of signal alteration in the left parietal subcortical white matter.    A follow-up brain MRI, on 07/10/2024, was reported to show:  \"No significant change in the expansile multicystic nonenhancing FLAIR hyperintense lesion involving the right hippocampus compared to brain MRI 06/16/2022 and 12/06/2022. The differential diagnosis remains the same. DNET is favored. Ganglioglioma and multinodular and vacuolating neuronal tumor are possible but less favored.\"     Outpatient 3hr VEEG was normal, at [facility] on 03/22/2023.      Epilepsy therapeutics:   The patient started levetiracetam in February 2023.  He has not used other anti-seizure medications.       PAST MEDICAL-SURGICAL HISTORY:    1.  Lesional focal epilepsy with probable focal impaired seizures and generalized tonic-clonic seizures.    2.  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  3.  History of hyperlipidemia.     FAMILY HISTORY:  There is no family history of seizures or epilepsy, or of other neurological conditions.       PERSONAL AND SOCIAL HISTORY:  The patient grew up in [place].  He graduated from high school in regular classes.  He has worked in multiple positions, currently in construction of precision measurement equipment.  He is .  He lives with his teenage daughter.  He smokes about 1 pack of cigarettes per day.  He does not use ethanol or illicit recreational drugs.     REVIEW OF SYSTEMS:  The patient denied history of weakness, tremors or other abnormal involuntary movements, numbness or tingling, incoordination, falling or difficulty in walking, urinary or fecal incontinence, headache and other pain, except as described above.  The patient denied any history of severe depression or suicidal ideation, severe anxiety, panic attacks, hallucinations, delusions, psychosis, substance abuse, or psychiatric hospitalization.  He denied rashes and easy bruising.  The remainder " of a 14-system symptom review was negative.     MEDICATIONS:  Levetiracetam 1000 mg b.i.d., atorvastatin, and other medications as per the electronic medical record.     PHYSICAL EXAMINATION:    On examination, the patient was an alert man in no apparent distress.  Vital signs were as per the electronic medical record.  Skull was normocephalic and atraumatic.  Neck was supple, without signs of meningeal irritation.                                             On neurological examination, the patient appeared alert and was fully oriented to person, place, time, and reason for visit.  Speech showed normal articulation, fluency, repetitions, naming, syntax and comprehension.   Cranial nerves III through XII were normal.  Muscle masses, tones and strengths were normal throughout.  There was no pronator drift.  Sequential fine finger movements were performed normally with each hand.  No spontaneous tremors, myoclonus, or other abnormal movements were observed.  Sensations of light touch were reportedly normal throughout.  The rapid alternating movements, and finger-nose-finger and heel-shin maneuvers were performed normally bilaterally.  Romberg maneuver was negative.  Regular, heel, toe, tandem and reverse tandem walking were normal.  Deep tendon reflexes were normal and symmetric throughout.  Toes were downgoing bilaterally.       IMPRESSION:    The patient has had no further seizures after the levetiracetam dose increase, which was made following the most recent grand mal seizure on 08/02/2023.  He levetiracetam level was higher on this dose, at 24.2 mcg/ml on 01/02/2025.  He has no apparent adverse levetiracetam effects currently.      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  A follow-up brain MRI, on 07/10/2024, was reported to show:no significant change in the lesion.  His neurosurgeon, Dr. Luis Choudhury, is planning to obtain serial  brain MRI studies.     We reviewed the results of the EEG, which was normal, on 03/22/2023.       The patient gives a history of highly stereotyped episodes that almost certainly represent focal seizures of limbic system origin.  Since he does not recall speech impairment and left facial twitching that observers note, it may be suspected that he has some degree of memory impairment during these events.  He also has had generalized tonic-clonic seizures.       I reviewed [place] regulations on seizures and driving with the patient.  He appeared to clearly understand that he would be prohibited from operating a motor vehicle within 3 months following any future seizure or other episode with sudden unconsciousness or inability to sit up, and that he is required to report any future such seizure to the [facility] within 30 days after the event.       PLAN:    1.  Continue levetiracetam at the current dose.  2.  Return visit in approximately 11 months.     I spent 42 minutes in this patient care, with 30 minutes in direct patient contact, and 12 minutes in chart review and document preparation on the day of the visit.         Aldo Collins M.D.   Professor of Neurology

## 2025-02-27 NOTE — LETTER
2025       RE: Dewey Sultana  : 1970   MRN: [id number]      Dear Colleague,    Thank you for referring your patient, Dewey Sultana, to the [facility] EPILEPSY [facility]ARE at [facility]. Please see a copy of my visit note below.      [facility]:  RETURN VISIT          Service Date: 2025     HISTORY:  Mr. 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  He came alone to the visit today.     He reported that he has not had any seizures, loss of consciousness, or other paroxysmal phenomena, following the most recent visit to this clinic on 2023.       The most recent grand mal seizure occurred on 2023; the levetiracetam level was 4.2 mcg/ml that day, and he denied missing doses before the seizure.  The levetiracetam dose was increased after this seizure.    The most recent event with an aura and impaired speech occurred during the last week of 2023.       He denied adverse effects of levetiracetam, which he is taking twice daily as prescribed.       Ictal semiology-history:   The patient confirmed previously reported history today.  He confirmed that he was having stereotyped paroxysmal events with primarily subjective phenomena since , and he had a first and only generalized tonic-clonic seizure in 2022.     The non-convulsive events have been going on for at least several times per year since onset in .  At times, they were significantly more frequent, up to twice per day for brief periods.  One occurred during the last week of 2023.  These always began with a peculiar, but highly stereotyped mental feeling that he has just been startled and is physically unsettled.  He then feels compelled to take repeated deep breaths, although he never feels short of breath.  This lasts for approximately 1 minute, after which he returns to baseline,  with no apparent postictal state.  His daughter has seen a number of these events, and she consistently notes that he has slurred speech, although speech content is not confused, and he often has left periocular twitching repeatedly, during the phase where he is taking repeated deep breaths.  Recently, a coworker told him that during one of these events, the coworker also witnessed left periocular twitching and slurred speech.  The patient himself is not aware of left facial movements or speech alteration.     The patient had a witnessed generalized tonic-clonic seizure on 2022.  His daughter heard noises in his bedroom, and went in to observe him to be stiffly extended with generalized jerking for about a minute.  Afterwards, he was quite confused for many minutes and then mildly confused for another hour.  He was found to have bitten his tongue with blood in his mouth.  Approximately 5 days later, he went to the emergency room for a consultation regarding this episode, at which time he had CT scanning and then a brain MRI, which showed the right hippocampal lesion.     The patient denied that he has ever had a non-convulsive falling with unconsciousness, repetitive involuntary movements or posturing, sudden brief confusion, or other paroxysmal phenomena.  He denied any history of sudden involuntary jerks while fully awake, but he has had hypnic jerks.       The patient does not recognize exacerbating or remitting factors with regard to seizure frequency.      Epilepsy-seizure predispositions:   The patient has no family history of epilepsy or seizures.  He has no history of gestational or  injury, febrile convulsions, developmental delay, stroke, meningitis, encephalitis, significant head injury, or other epileptic predispositions except for a brain MRI lesion.       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  The brain MRI also showed a cortical site of signal alteration in the right paracentral lobule and another area of signal alteration in the left parietal subcortical white matter.    A follow-up brain MRI, on 07/10/2024, was reported to show:  \"No significant change in the expansile multicystic nonenhancing FLAIR hyperintense lesion involving the right hippocampus compared to brain MRI 06/16/2022 and 12/06/2022. The differential diagnosis remains the same. DNET is favored. Ganglioglioma and multinodular and vacuolating neuronal tumor are possible but less favored.\"     Outpatient 3hr VEEG was normal, at [facility] on 03/22/2023.      Epilepsy therapeutics:   The patient started levetiracetam in February 2023.  He has not used other anti-seizure medications.       PAST MEDICAL-SURGICAL HISTORY:    1.  Lesional focal epilepsy with probable focal impaired seizures and generalized tonic-clonic seizures.    2.  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  3.  History of hyperlipidemia.     FAMILY HISTORY:  There is no family history of seizures or epilepsy, or of other neurological conditions.       PERSONAL AND SOCIAL HISTORY:  The patient grew up in [place].  He graduated from high school in regular classes.  He has worked in multiple positions, currently in construction of precision measurement equipment.  He is .  He lives with his teenage daughter.  He smokes about 1 pack of cigarettes per day.  He does not use ethanol or illicit recreational drugs.     REVIEW OF SYSTEMS:  The patient denied history of weakness, tremors or other abnormal involuntary movements, numbness or tingling, incoordination, falling or difficulty in walking, urinary or fecal incontinence, " headache and other pain, except as described above.  The patient denied any history of severe depression or suicidal ideation, severe anxiety, panic attacks, hallucinations, delusions, psychosis, substance abuse, or psychiatric hospitalization.  He denied rashes and easy bruising.  The remainder of a 14-system symptom review was negative.     MEDICATIONS:  Levetiracetam 1000 mg b.i.d., atorvastatin, and other medications as per the electronic medical record.     PHYSICAL EXAMINATION:    On examination, the patient was an alert man in no apparent distress.  Vital signs were as per the electronic medical record.  Skull was normocephalic and atraumatic.  Neck was supple, without signs of meningeal irritation.                                             On neurological examination, the patient appeared alert and was fully oriented to person, place, time, and reason for visit.  Speech showed normal articulation, fluency, repetitions, naming, syntax and comprehension.   Cranial nerves III through XII were normal.  Muscle masses, tones and strengths were normal throughout.  There was no pronator drift.  Sequential fine finger movements were performed normally with each hand.  No spontaneous tremors, myoclonus, or other abnormal movements were observed.  Sensations of light touch were reportedly normal throughout.  The rapid alternating movements, and finger-nose-finger and heel-shin maneuvers were performed normally bilaterally.  Romberg maneuver was negative.  Regular, heel, toe, tandem and reverse tandem walking were normal.  Deep tendon reflexes were normal and symmetric throughout.  Toes were downgoing bilaterally.       IMPRESSION:    The patient has had no further seizures after the levetiracetam dose increase, which was made following the most recent grand mal seizure on 08/02/2023.  He levetiracetam level was higher on this dose, at 24.2 mcg/ml on 01/02/2025.  He has no apparent adverse levetiracetam effects  currently.      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  A follow-up brain MRI, on 07/10/2024, was reported to show:no significant change in the lesion.  His neurosurgeon, Dr. Luis Choudhury, is planning to obtain serial brain MRI studies.     We reviewed the results of the EEG, which was normal, on 03/22/2023.       The patient gives a history of highly stereotyped episodes that almost certainly represent focal seizures of limbic system origin.  Since he does not recall speech impairment and left facial twitching that observers note, it may be suspected that he has some degree of memory impairment during these events.  He also has had generalized tonic-clonic seizures.       I reviewed [place] regulations on seizures and driving with the patient.  He appeared to clearly understand that he would be prohibited from operating a motor vehicle within 3 months following any future seizure or other episode with sudden unconsciousness or inability to sit up, and that he is required to report any future such seizure to the [facility] within 30 days after the event.       PLAN:    1.  Continue levetiracetam at the current dose.  2.  Return visit in approximately 11 months.     I spent 42 minutes in this patient care, with 30 minutes in direct patient contact, and 12 minutes in chart review and document preparation on the day of the visit.         Aldo Collins M.D.   Professor of Neurology       Again, thank you for allowing me to participate in the care of your patient.      Sincerely,    Aldo Collins MD

## 2025-03-06 ENCOUNTER — HOSPITAL ENCOUNTER (EMERGENCY)
Facility: CLINIC | Age: 55
End: 2025-03-06
Attending: STUDENT IN AN ORGANIZED HEALTH CARE EDUCATION/TRAINING PROGRAM
Payer: COMMERCIAL

## 2025-03-06 VITALS
HEIGHT: 65 IN | SYSTOLIC BLOOD PRESSURE: 149 MMHG | DIASTOLIC BLOOD PRESSURE: 78 MMHG | RESPIRATION RATE: 24 BRPM | BODY MASS INDEX: 22.49 KG/M2 | OXYGEN SATURATION: 96 % | WEIGHT: 135 LBS | HEART RATE: 95 BPM | TEMPERATURE: 97.9 F

## 2025-03-06 DIAGNOSIS — G40.919 BREAKTHROUGH SEIZURE (H): ICD-10-CM

## 2025-03-06 LAB
ALBUMIN SERPL BCG-MCNC: 4.3 G/DL (ref 3.5–5.2)
ALP SERPL-CCNC: 99 U/L (ref 40–150)
ALT SERPL W P-5'-P-CCNC: 18 U/L (ref 0–70)
ANION GAP SERPL CALCULATED.3IONS-SCNC: 18 MMOL/L (ref 7–15)
AST SERPL W P-5'-P-CCNC: 18 U/L (ref 0–45)
BASOPHILS # BLD AUTO: 0.1 10E3/UL (ref 0–0.2)
BASOPHILS NFR BLD AUTO: 1 %
BILIRUB SERPL-MCNC: 0.4 MG/DL
BUN SERPL-MCNC: 10.3 MG/DL (ref 6–20)
CALCIUM SERPL-MCNC: 9.4 MG/DL (ref 8.8–10.4)
CHLORIDE SERPL-SCNC: 96 MMOL/L (ref 98–107)
CREAT SERPL-MCNC: 0.83 MG/DL (ref 0.67–1.17)
EGFRCR SERPLBLD CKD-EPI 2021: >90 ML/MIN/1.73M2
EOSINOPHIL # BLD AUTO: 0.3 10E3/UL (ref 0–0.7)
EOSINOPHIL NFR BLD AUTO: 3 %
ERYTHROCYTE [DISTWIDTH] IN BLOOD BY AUTOMATED COUNT: 12.8 % (ref 10–15)
GLUCOSE SERPL-MCNC: 137 MG/DL (ref 70–99)
HCO3 SERPL-SCNC: 17 MMOL/L (ref 22–29)
HCT VFR BLD AUTO: 43.6 % (ref 40–53)
HGB BLD-MCNC: 15.8 G/DL (ref 13.3–17.7)
IMM GRANULOCYTES # BLD: 0 10E3/UL
IMM GRANULOCYTES NFR BLD: 0 %
LYMPHOCYTES # BLD AUTO: 3.5 10E3/UL (ref 0.8–5.3)
LYMPHOCYTES NFR BLD AUTO: 30 %
MCH RBC QN AUTO: 32.2 PG (ref 26.5–33)
MCHC RBC AUTO-ENTMCNC: 36.2 G/DL (ref 31.5–36.5)
MCV RBC AUTO: 89 FL (ref 78–100)
MONOCYTES # BLD AUTO: 1.3 10E3/UL (ref 0–1.3)
MONOCYTES NFR BLD AUTO: 11 %
NEUTROPHILS # BLD AUTO: 6.5 10E3/UL (ref 1.6–8.3)
NEUTROPHILS NFR BLD AUTO: 55 %
NRBC # BLD AUTO: 0 10E3/UL
NRBC BLD AUTO-RTO: 0 /100
PLATELET # BLD AUTO: 243 10E3/UL (ref 150–450)
POTASSIUM SERPL-SCNC: 3.4 MMOL/L (ref 3.4–5.3)
PROT SERPL-MCNC: 7 G/DL (ref 6.4–8.3)
RBC # BLD AUTO: 4.91 10E6/UL (ref 4.4–5.9)
SODIUM SERPL-SCNC: 131 MMOL/L (ref 135–145)
WBC # BLD AUTO: 11.7 10E3/UL (ref 4–11)

## 2025-03-06 PROCEDURE — 85018 HEMOGLOBIN: CPT | Performed by: STUDENT IN AN ORGANIZED HEALTH CARE EDUCATION/TRAINING PROGRAM

## 2025-03-06 PROCEDURE — 36415 COLL VENOUS BLD VENIPUNCTURE: CPT | Performed by: STUDENT IN AN ORGANIZED HEALTH CARE EDUCATION/TRAINING PROGRAM

## 2025-03-06 PROCEDURE — 99284 EMERGENCY DEPT VISIT MOD MDM: CPT | Mod: 25

## 2025-03-06 PROCEDURE — 80053 COMPREHEN METABOLIC PANEL: CPT | Performed by: STUDENT IN AN ORGANIZED HEALTH CARE EDUCATION/TRAINING PROGRAM

## 2025-03-06 PROCEDURE — 85025 COMPLETE CBC W/AUTO DIFF WBC: CPT | Performed by: STUDENT IN AN ORGANIZED HEALTH CARE EDUCATION/TRAINING PROGRAM

## 2025-03-06 PROCEDURE — 258N000003 HC RX IP 258 OP 636: Performed by: STUDENT IN AN ORGANIZED HEALTH CARE EDUCATION/TRAINING PROGRAM

## 2025-03-06 PROCEDURE — 250N000011 HC RX IP 250 OP 636: Performed by: STUDENT IN AN ORGANIZED HEALTH CARE EDUCATION/TRAINING PROGRAM

## 2025-03-06 PROCEDURE — 96365 THER/PROPH/DIAG IV INF INIT: CPT

## 2025-03-06 PROCEDURE — 80177 DRUG SCRN QUAN LEVETIRACETAM: CPT | Performed by: STUDENT IN AN ORGANIZED HEALTH CARE EDUCATION/TRAINING PROGRAM

## 2025-03-06 PROCEDURE — 99284 EMERGENCY DEPT VISIT MOD MDM: CPT | Performed by: STUDENT IN AN ORGANIZED HEALTH CARE EDUCATION/TRAINING PROGRAM

## 2025-03-06 RX ADMIN — LEVETIRACETAM 1000 MG: 100 INJECTION, SOLUTION, CONCENTRATE INTRAVENOUS at 23:22

## 2025-03-06 ASSESSMENT — COLUMBIA-SUICIDE SEVERITY RATING SCALE - C-SSRS
6. HAVE YOU EVER DONE ANYTHING, STARTED TO DO ANYTHING, OR PREPARED TO DO ANYTHING TO END YOUR LIFE?: NO
6. HAVE YOU EVER DONE ANYTHING, STARTED TO DO ANYTHING, OR PREPARED TO DO ANYTHING TO END YOUR LIFE?: NO
1. IN THE PAST MONTH, HAVE YOU WISHED YOU WERE DEAD OR WISHED YOU COULD GO TO SLEEP AND NOT WAKE UP?: NO
1. IN THE PAST MONTH, HAVE YOU WISHED YOU WERE DEAD OR WISHED YOU COULD GO TO SLEEP AND NOT WAKE UP?: NO
2. HAVE YOU ACTUALLY HAD ANY THOUGHTS OF KILLING YOURSELF IN THE PAST MONTH?: NO
2. HAVE YOU ACTUALLY HAD ANY THOUGHTS OF KILLING YOURSELF IN THE PAST MONTH?: NO

## 2025-03-06 ASSESSMENT — ACTIVITIES OF DAILY LIVING (ADL): ADLS_ACUITY_SCORE: 41

## 2025-03-07 VITALS
WEIGHT: 135 LBS | DIASTOLIC BLOOD PRESSURE: 78 MMHG | HEIGHT: 65 IN | BODY MASS INDEX: 22.49 KG/M2 | HEART RATE: 95 BPM | SYSTOLIC BLOOD PRESSURE: 149 MMHG | OXYGEN SATURATION: 96 % | RESPIRATION RATE: 24 BRPM | TEMPERATURE: 97.9 F

## 2025-03-07 LAB — LEVETIRACETAM SERPL-MCNC: 12.5 ÂΜG/ML (ref 10–40)

## 2025-03-07 NOTE — DISCHARGE INSTRUCTIONS
Given that you had a breakthrough seizure today I would try to contact the neurology team tomorrow for further recommendations on your medications.  We did give you a loading dose of Keppra through the IV which should help prevent another seizure.  If you have another seizure come back to the hospital.  We discussed the rest of your blood work today.  The Keppra level will not come back today however may be useful for the neurology team when you follow-up with them.    Please call your primary doctor in the morning to discuss your ER visit, make sure you are doing well, and to follow up on any incidental findings. Please come back to the ER for re evaluation if you feel like things are getting worse or have other concerns.

## 2025-03-07 NOTE — ED TRIAGE NOTES
PT brought in by EMS with c/o seizure like activity, foaming at the mouth, seizure lasted less than 5 minutes, around 2200-2230H this evening. PT's daughter found the PT seizing and assisted him on the ground and called 911. EMS found the PT sitting on the couch, post-tictal. . PT has known history of seizure and recent changes with the medication dose.

## 2025-03-07 NOTE — ED PROVIDER NOTES
History     Chief Complaint   Patient presents with    Seizures     HPI  Dewey Sultana is a 54 year old male who presents to the emergency department by EMS.  He likely had a seizure this evening that was witnessed by his daughter.  When his daughter came home she heard the dog barking, saw him in the chair shaking and flexing his arms, typical manifestation of previous seizures.  He did have some blood in the mouth.  Did not urinate himself.  She did mention that this lasted less than 5 minutes, she slowly lowered him to the floor, no associated trauma.  The seizure stopped on its own.  He was postictal for roughly half hour afterwards.  EMS noted his mental status to clear and route.  He currently has no symptoms.  He takes Keppra 1000 mg twice a day, believes he took his morning dose, unsure of his evening dose at 10 PM.    Allergies:  Allergies   Allergen Reactions    No Known Drug Allergy        Problem List:    Patient Active Problem List    Diagnosis Date Noted    Dupuytren's contracture 01/03/2025     Priority: Medium    Swelling of right middle finger 10/18/2024     Priority: Medium    Partial epilepsy with loss of consciousness, intractable (H) 02/16/2023     Priority: Medium    Excessive caffeine intake 12/06/2022     Priority: Medium    CARDIOVASCULAR SCREENING; LDL GOAL LESS THAN 130 04/08/2013     Priority: Medium    Depressive disorder, not elsewhere classified 12/13/2007     Priority: Medium    Tobacco use disorder 06/01/2007     Priority: Medium    Esophageal reflux 06/01/2007     Priority: Medium        Past Medical History:    Past Medical History:   Diagnosis Date    Depressive disorder, not elsewhere classified     Excessive caffeine intake 12/6/2022    Unspecified hemorrhoids without mention of complication        Past Surgical History:    Past Surgical History:   Procedure Laterality Date    COLONOSCOPY  07/07/2006     ANOSCOPY W BIOPSY SINGLE/MULTIPLE  06/09/06    Large internal  "hemorrhoids with a component of rectal prolapse    HC INCISION TENDON SHEATH FINGER      Trigger finger release, right.       Family History:    Family History   Problem Relation Age of Onset    Diabetes Brother     Alcohol/Drug Brother        Social History:  Marital Status:  Single [1]  Social History     Tobacco Use    Smoking status: Every Day     Current packs/day: 1.00     Average packs/day: 1 pack/day for 20.0 years (20.0 ttl pk-yrs)     Types: Cigarettes     Passive exposure: Current    Smokeless tobacco: Never   Vaping Use    Vaping status: Never Used   Substance Use Topics    Alcohol use: Yes    Drug use: No        Medications:    diclofenac (VOLTAREN) 75 MG EC tablet  levETIRAcetam (KEPPRA) 500 MG tablet          Review of Systems  Gen: No fevers, no unintentional weight changes  Head and neck: No headache, no neck pain  Eye: No eye pain, no vision changes  Respiratory: No shortness of breath, no cough  Cardiovascular: No chest pain, no palpitations  Abdominal: No vomiting, no constipation, no diarrhea  Urinary: No dysuria, no hematuria  Musculoskeletal: No joint redness, no trauma  Neurologic: No confusion, no weakness, no sensation changes  Psychiatric: No suicidal ideation, no homicidal ideation    Physical Exam   BP: (!) 149/78  Pulse: 95  Temp: 97.9  F (36.6  C)  Resp: 24  Height: 165.1 cm (5' 5\")  Weight: 61.2 kg (135 lb)  SpO2: 96 %      Physical Exam  General: Alert, awake, no distress  Head: Normocephalic, atraumatic  Eyes: Grossly intact extraocular movements, conjunctiva clear, pupils equal   Mouth: Mucous membranes moist, tongue bite injury on the left aspect  Neck: Supple, grossly full range of motion, no observable masses  Respiratory: No distress,  clear to auscultation bilaterally  Cardiac: S1 and S2 cardiac sounds appreciated, normal rate, no edema  Abdominal: Soft, not distended, no tenderness appreciated  Neurologic: Oriented x 4, normal level of consciousness, speech is clear and " appropriate, intact strength and sensation all 4 extremities, cranial nerve II through XII grossly intact  Skin: No gross rashes or lesions  Psych: Normal mood and appropriate for situation        ED Course        Procedures                  No results found for this or any previous visit (from the past 24 hours).    Medications - No data to display    Assessments & Plan (with Medical Decision Making)     Vital signs reassuring.  Reviewed his EMR including his epilepsy physician note from roughly 1 week prior.  Loaded with Keppra 1 gram today.  EMS checked a blood sugar noted to be normal.    I have reviewed the nursing notes.    I have reviewed the findings, diagnosis, plan and need for follow up with the patient.          I reviewed [place] regulations on seizures and driving with the patient.  He appeared to clearly understand that he would be prohibited from operating a motor vehicle within 3 months following any future seizure or other episode with sudden unconsciousness or inability to sit up, and that he is required to report any future such seizure to the DPS within 30 days after the event.     New Prescriptions    No medications on file       Final diagnoses:   None       3/6/2025   [facility] EMERGENCY DEPT     Neutrophils 6.5 1.6 - 8.3 10e3/uL    Absolute Lymphocytes 3.5 0.8 - 5.3 10e3/uL    Absolute Monocytes 1.3 0.0 - 1.3 10e3/uL    Absolute Eosinophils 0.3 0.0 - 0.7 10e3/uL    Absolute Basophils 0.1 0.0 - 0.2 10e3/uL    Absolute Immature Granulocytes 0.0 <=0.4 10e3/uL    Absolute NRBCs 0.0 10e3/uL       Medications   levETIRAcetam (KEPPRA) 1,000 mg in sodium chloride 0.9 % 120 mL intermittent infusion (0 mg Intravenous Stopped 3/6/25 2350)       Assessments & Plan (with Medical Decision Making)     Vital signs reassuring.  Reviewed his EMR including his epilepsy physician note from roughly 1 week prior.  Loaded with Keppra 1 gram today.  EMS checked a blood sugar noted to be normal.    Blood work is overall reassuring.  His sodium is 131, consistent with 1 year prior.  Minor elevation of anion gap is likely from his seizure tonight.  His leukocytosis of 11.7 is likely reactive.  Keppra level to come back tonight.  He remains at his baseline mental status without any particular complaints.  His daughter is with him and states he is at his normal self.  He is encouraged to take his medications as prescribed at home and since tomorrow is a weekday he is encouraged to call his neurology team for further recommendations.  I did read through his neurology note including [place] regulations on seizures and driving and this was printed out and given to him.    Discussed todays ER visit and current agreed upon treatment plan. Education provided and all questions answered. Instructed to follow up with pcp to discuss ER visit, make sure they are doing well, and to follow up on any incidental findings. Encouraged to come back to the ER for re evaluation if worsening or any other concerns.  I have reviewed the nursing notes.    I have reviewed the findings, diagnosis, plan and need for follow up with the patient.          I reviewed [place] regulations on seizures and driving with the patient.  He appeared to clearly understand  that he would be prohibited from operating a motor vehicle within 3 months following any future seizure or other episode with sudden unconsciousness or inability to sit up, and that he is required to report any future such seizure to the DPS within 30 days after the event.     New Prescriptions    No medications on file       Final diagnoses:   Breakthrough seizure (H)       3/6/2025   [facility] EMERGENCY DEPT       Antwan Fallon MD  03/07/25 0039

## 2025-03-22 ENCOUNTER — HEALTH MAINTENANCE LETTER (OUTPATIENT)
Age: 55
End: 2025-03-22